# Patient Record
Sex: FEMALE | Race: OTHER | NOT HISPANIC OR LATINO | Employment: UNEMPLOYED | URBAN - NONMETROPOLITAN AREA
[De-identification: names, ages, dates, MRNs, and addresses within clinical notes are randomized per-mention and may not be internally consistent; named-entity substitution may affect disease eponyms.]

---

## 2017-07-31 ENCOUNTER — OFFICE VISIT (OUTPATIENT)
Dept: NEUROSURGERY | Facility: CLINIC | Age: 57
End: 2017-07-31

## 2017-07-31 ENCOUNTER — HOSPITAL ENCOUNTER (OUTPATIENT)
Dept: GENERAL RADIOLOGY | Facility: HOSPITAL | Age: 57
Discharge: HOME | End: 2017-07-31
Admitting: NURSE PRACTITIONER

## 2017-07-31 VITALS — HEIGHT: 60 IN | BODY MASS INDEX: 23.56 KG/M2 | WEIGHT: 120 LBS

## 2017-07-31 DIAGNOSIS — M54.41 CHRONIC BILATERAL LOW BACK PAIN WITH RIGHT-SIDED SCIATICA: Primary | ICD-10-CM

## 2017-07-31 DIAGNOSIS — G89.29 CHRONIC BILATERAL LOW BACK PAIN WITH RIGHT-SIDED SCIATICA: Primary | ICD-10-CM

## 2017-07-31 PROBLEM — IMO0001 NORMAL BODY MASS INDEX (BMI): Status: ACTIVE | Noted: 2017-07-31

## 2017-07-31 PROBLEM — Z78.9 TOBACCO NON-USER: Status: ACTIVE | Noted: 2017-07-31

## 2017-07-31 PROCEDURE — 72114 X-RAY EXAM L-S SPINE BENDING: CPT

## 2017-07-31 PROCEDURE — 99204 OFFICE O/P NEW MOD 45 MIN: CPT | Performed by: NURSE PRACTITIONER

## 2017-07-31 RX ORDER — ETODOLAC 400 MG/1
400 TABLET, FILM COATED ORAL 2 TIMES DAILY
COMMUNITY
End: 2018-07-24

## 2017-07-31 RX ORDER — ASPIRIN 81 MG/1
81 TABLET ORAL DAILY
COMMUNITY

## 2017-07-31 RX ORDER — LOSARTAN POTASSIUM 25 MG/1
25 TABLET ORAL DAILY
COMMUNITY
End: 2020-07-01

## 2017-07-31 RX ORDER — SIMVASTATIN 10 MG
10 TABLET ORAL NIGHTLY
COMMUNITY

## 2017-07-31 NOTE — PROGRESS NOTES
Neurosurgery Initial Patient Visit    Patient: Nikki Hutchins  : 1960    Primary Care Provider: Robi Tom DO    Requesting Provider: GARY Hennessy      History    Chief Complaint:   Chief Complaint   Patient presents with   • Back Pain     Complains of low back and right hip/leg pain since 2016. No injury. Complains of pain all the way down right leg and occasional numbness in R foot. She has trouble sitting or standing for prolonged periods. No physical therapy. No pain management. Has tried steriods with no relief. Dr. Jonas has ruled out any hip problems. She has not had any imaging of her back.       History of Present Illness: She is a 56-year-old female who presents with low back, right hip, and right leg pain.  She is referred by GARY Hennessy, and we have been asked to see the patient in consultation for further evaluation.    She gives history of onset of right lower back and right hip pain in 2016.  She does not recall a specific contributing injury.  She has continued to have issues and describes pain that radiates down the right leg more posterior laterally.  This does at times reach all the way to the right foot and occasionally she feels tingling in the right leg.  Sometimes both of her feet will feel numb as well as her hands, but she does not describe any other radicular features in the left leg.  She underwent orthopedic evaluation of the right hip and no abnormalities were found.  She has also had blood work, specifically to check for rheumatoid arthritis or other inflammatory process and does not yet know the results.  Last year, she initially saw a chiropractor but it did not help her.  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  She states that her pain is constant to some degree and sometimes she feels it radiating along the right groin.  She has been treated with  anti-inflammatories as well as oral steroids but states she has not gotten good relief.  Her only imaging to this point is specific to the right hip.    Allergies:   Niacin and related    Past Medical History:    Past Medical History:   Diagnosis Date   • Arthritis    • Hypertension        Past Surgical History:   Past Surgical History:   Procedure Laterality Date   • BLADDER SUSPENSION     • BUNIONECTOMY Bilateral    • CATARACT EXTRACTION Bilateral    •  SECTION     • HYSTERECTOMY         Medications:  Aspirin, Etodolac, Losartan, Simvastatin    Social History:   reports that she has never smoked. She has never used smokeless tobacco. She reports that she does not drink alcohol or use illicit drugs.  She is  and lives in [place].  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    Family History:  Also inlcudes rheumatoid arthritis, heart disease, and MI.  Family History   Problem Relation Age of Onset   • Arthritis Father    • Hypertension Brother        Review of Systems:  Review of Systems   Constitutional: Negative for chills, fever and unexpected weight change.   HENT: Negative for congestion, hearing loss, rhinorrhea, sore throat and tinnitus.    Eyes: Negative for photophobia and visual disturbance.        (+) Reading glasses   Respiratory: Negative for cough, shortness of breath and wheezing.    Cardiovascular: Negative for chest pain and palpitations.   Gastrointestinal: Negative for constipation, diarrhea, nausea and vomiting.   Genitourinary: Negative for difficulty urinating.   Musculoskeletal: Negative for arthralgias, back pain, gait problem and neck pain.   Skin: Negative for rash.   Allergic/Immunologic: Negative for environmental allergies.   Neurological: Negative for seizures, numbness and headaches.   Hematological: Bruises/bleeds easily.        (+)Aspirin use   Psychiatric/Behavioral: Negative for confusion. The patient is not nervous/anxious.    All other systems reviewed and are  negative.          Neurological Physical Examination    Physical Exam   Constitutional: She is oriented to person, place, and time. She appears well-developed and well-nourished. No distress.   She is pleasant and cooperative in no acute distress.  She sometimes speaks very rapidly and her speech is somewhat heavily accented.   HENT:   Head: Normocephalic and atraumatic.   Eyes: No scleral icterus.   Neck: Neck supple. No tracheal deviation and normal range of motion present.   Cardiovascular: Normal rate, regular rhythm and normal heart sounds.    No murmur heard.  Pulmonary/Chest: Effort normal and breath sounds normal. No respiratory distress. She has no wheezes.   Musculoskeletal:   She ambulates with fairly steady gait.  Lumbar mobility is quite good.  Straight leg raising is done relatively well bilaterally.  Perhaps some mild response on the right, but at a fairly 90°.  Deep tendon reflexes 1-2+.  There is some weakness of right dorsiflexion.  No tenderness upon palpation of the lumbar spine, right SI joint, right buttock or right lateral trochanter.   Neurological: She is alert and oriented to person, place, and time. She displays normal reflexes. No cranial nerve deficit.   Optic discs not visualized.   Skin: Skin is warm and dry. No rash noted.   Psychiatric: She has a normal mood and affect. Her speech is normal and behavior is normal. Cognition and memory are normal.   Vitals reviewed.         Medical Decision Making    Management Options:  In the office we have discussed her care and I have reviewed her referral records.  We have been able to obtain her lab test results that show an unremarkable CRP normal sedimentation rate, negative APOORVA, and negative RA factor.    We will plan to review these with the patient at her follow-up appointment.  We have agreed that she needs an x-ray of the lumbar spine and we will also order physical therapy for 2-3 weeks.  She has asked if it is okay for her to do home  exercises and to try a Pilates machine.  She may try this to see if she can tolerate it.  She feels she can keep working her normal schedule.  We will see her back in about a month, hoping that she has a good response to therapy.  If she cannot improve, then we will need an MRI of the lumbar spine to further evaluate her right lower extremity radiculopathy and weakness.  She understands and is agreeable.    Nikki was seen today for back pain.    Diagnoses and all orders for this visit:    Chronic bilateral low back pain with right-sided sciatica  -     XR Spine Lumbar Complete With Flex & Ext  -     Ambulatory Referral to Physical Therapy Evaluate and treat (3 days a week x 3-4 weeks)    Normal body mass index (BMI)    Tobacco non-user        Thank you, GARY Hennessy, for this Consultation and the opportunity to participate in the care of this pleasant patient.

## 2017-07-31 NOTE — PATIENT INSTRUCTIONS

## 2017-09-11 ENCOUNTER — OFFICE VISIT (OUTPATIENT)
Dept: NEUROSURGERY | Facility: CLINIC | Age: 57
End: 2017-09-11

## 2017-09-11 VITALS
DIASTOLIC BLOOD PRESSURE: 76 MMHG | SYSTOLIC BLOOD PRESSURE: 120 MMHG | WEIGHT: 120 LBS | BODY MASS INDEX: 23.56 KG/M2 | HEIGHT: 60 IN

## 2017-09-11 DIAGNOSIS — IMO0001 NORMAL BODY MASS INDEX (BMI): ICD-10-CM

## 2017-09-11 DIAGNOSIS — M43.16 SPONDYLOLISTHESIS, LUMBAR REGION: Primary | ICD-10-CM

## 2017-09-11 DIAGNOSIS — M54.16 LUMBAR RADICULOPATHY: ICD-10-CM

## 2017-09-11 DIAGNOSIS — Z78.9 TOBACCO NON-USER: ICD-10-CM

## 2017-09-11 PROBLEM — G89.29 CHRONIC BILATERAL LOW BACK PAIN WITH RIGHT-SIDED SCIATICA: Status: RESOLVED | Noted: 2017-07-31 | Resolved: 2017-09-11

## 2017-09-11 PROBLEM — M54.41 CHRONIC BILATERAL LOW BACK PAIN WITH RIGHT-SIDED SCIATICA: Status: RESOLVED | Noted: 2017-07-31 | Resolved: 2017-09-11

## 2017-09-11 PROCEDURE — 99213 OFFICE O/P EST LOW 20 MIN: CPT | Performed by: NURSE PRACTITIONER

## 2017-09-11 RX ORDER — TRAMADOL HYDROCHLORIDE 50 MG/1
50 TABLET ORAL 3 TIMES DAILY
Qty: 90 TABLET | Refills: 0 | Status: SHIPPED | OUTPATIENT
Start: 2017-09-11 | End: 2017-10-11

## 2017-09-11 RX ORDER — GABAPENTIN 300 MG/1
CAPSULE ORAL
Qty: 60 CAPSULE | Refills: 2 | Status: SHIPPED | OUTPATIENT
Start: 2017-09-11 | End: 2018-07-24

## 2017-09-11 RX ORDER — LISINOPRIL 10 MG/1
TABLET ORAL
COMMUNITY
Start: 2017-08-29 | End: 2018-07-24

## 2017-09-11 NOTE — PROGRESS NOTES
"Neurosurgery Follow Up Office Visit      Patient Name:  Nikki Hutchins  Age:  56 y.o.  YOB: 1960  MR#:  [id number]    /76 (BP Location: Right arm, Patient Position: Sitting)  Ht 60\" (152.4 cm)  Wt 120 lb (54.4 kg)  BMI 23.44 kg/m2    Social History   Substance Use Topics   • Smoking status: Never Smoker   • Smokeless tobacco: Never Used   • Alcohol use No       Chief Complaint   Patient presents with   • Follow-up     Therapy is not helping. States that she is not able to improve due to working. She complains that work is causing her pain. She would like to have her pain medication changed to Cymbalta, she has never taken this before. Apparently her co-workers have recommended this.         History  Chief Complaint:  She returns to the office today for follow-up of low back and right leg pain, after physical therapy and for x-ray results.  She tells me that she is feeling about the same, but describes that she is having more trouble with work.  She has been doing physical therapy and has had at least 12 sessions.  She does not feel she is getting a lot of relief or improvement.  She had wanted to keep working if at all possible, but she is having a very difficult time with prolonged standing.  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  She had wanted to avoid any more medication if possible, but she tells me that sometimes her pain is so bad that she is crying.  She had a lumbar x-ray with flexion and extension that show significant listhesis at L4 5.  It is measured at approximately 8 mm and decreases down to 4 mm with flexion.      Physical Examination:  Upon exam, she looks pretty good.  She is awake and alert, pleasant and cooperative, speech is clear and appropriate.  Skin is warm and dry.  Straight leg raising is positive on the right.  Deep tendon reflexes 1-2+ bilaterally.  She continues with weakness of right dorsiflexion.  She also has a " mild weakness of left dorsiflexion as well.      Medical Decision Making  Treatment Options:   In the office we have discussed her care and I reviewed the x-ray results with her.  She has significant listhesis that is unstable, and I think likely surgical.  She needs to have an MRI to determine if there is any other abnormality that would need surgical treatment.  I think she has done a reasonable amount of physical therapy and I would put any further visits on hold.  She states this would be a big financial relief and she is very committed to continuing exercises at home.  We have talked a lot about medication today and will start her on Neurontin as well as Ultram.  With regards to work, we will place her off work until her follow-up appointment to review the MRI.  Due to her findings of listhesis and the fact that she is a probable candidate for lumbar fusion, we will have her to return with Dr. Zamora for full imaging review, surgical discussion and planning as indicated.  She is agreeable.      Assessment and Plan  Nikki was seen today for follow-up.    Diagnoses and all orders for this visit:    Spondylolisthesis, lumbar region  -     MRI Lumbar Spine Without Contrast; Future  -     gabapentin (NEURONTIN) 300 MG capsule; Take 1 tablet at bedtime for one week then start taking twice daily (morning and bedtime)  -     traMADol (ULTRAM) 50 MG tablet; Take 1 tablet by mouth 3 (Three) Times a Day for 30 days.    Lumbar radiculopathy  -     MRI Lumbar Spine Without Contrast; Future  -     gabapentin (NEURONTIN) 300 MG capsule; Take 1 tablet at bedtime for one week then start taking twice daily (morning and bedtime)  -     traMADol (ULTRAM) 50 MG tablet; Take 1 tablet by mouth 3 (Three) Times a Day for 30 days.    Normal body mass index (BMI)    Tobacco non-user        Return in about 3 weeks (around 10/2/2017) for MRI results and discuss surgery with Dr. Zamora.      GARY Hood

## 2017-10-04 ENCOUNTER — OFFICE VISIT (OUTPATIENT)
Dept: NEUROSURGERY | Facility: CLINIC | Age: 57
End: 2017-10-04

## 2017-10-04 ENCOUNTER — HOSPITAL ENCOUNTER (OUTPATIENT)
Dept: MRI IMAGING | Facility: HOSPITAL | Age: 57
Discharge: HOME | End: 2017-10-04
Admitting: NURSE PRACTITIONER

## 2017-10-04 VITALS
HEIGHT: 60 IN | WEIGHT: 120 LBS | SYSTOLIC BLOOD PRESSURE: 119 MMHG | DIASTOLIC BLOOD PRESSURE: 70 MMHG | BODY MASS INDEX: 23.56 KG/M2

## 2017-10-04 DIAGNOSIS — IMO0001 NORMAL BODY MASS INDEX (BMI): ICD-10-CM

## 2017-10-04 DIAGNOSIS — M54.16 LUMBAR RADICULOPATHY: ICD-10-CM

## 2017-10-04 DIAGNOSIS — M54.16 LUMBAR RADICULOPATHY: Primary | ICD-10-CM

## 2017-10-04 DIAGNOSIS — Z78.9 TOBACCO NON-USER: ICD-10-CM

## 2017-10-04 DIAGNOSIS — G89.29 CHRONIC BILATERAL LOW BACK PAIN WITH RIGHT-SIDED SCIATICA: ICD-10-CM

## 2017-10-04 DIAGNOSIS — M54.41 CHRONIC BILATERAL LOW BACK PAIN WITH RIGHT-SIDED SCIATICA: ICD-10-CM

## 2017-10-04 DIAGNOSIS — M43.16 SPONDYLOLISTHESIS, LUMBAR REGION: ICD-10-CM

## 2017-10-04 PROCEDURE — 72148 MRI LUMBAR SPINE W/O DYE: CPT

## 2017-10-04 PROCEDURE — 99213 OFFICE O/P EST LOW 20 MIN: CPT | Performed by: NEUROLOGICAL SURGERY

## 2017-10-04 NOTE — PROGRESS NOTES
"    Chief complaint   Chief Complaint   Patient presents with   • Back Pain        Subjective     HPI:     This patient is a 56-year-old female who presents with a one-year history of chief complaint of right hip pain and right leg pain.  She describes her pain as being in her iliac crest region along the right side.  Occasionally the pain will radiate to her right thigh above her knee.  She has undergone chiropractic manipulation without significant relief of her pain.  She has not undergone pain management or physical therapy and she presents for discussion of imaging results.  She does not endorse leg weakness or numbness or tingling.    Review of Systems     A 12 point review systems is obtained and is negative except for as described in HPI    Past Medical History:   Diagnosis Date   • Arthritis    • Hypertension      Past Surgical History:   Procedure Laterality Date   • BLADDER SUSPENSION     • BUNIONECTOMY Bilateral    • CATARACT EXTRACTION Bilateral    •  SECTION     • HYSTERECTOMY       Family History   Problem Relation Age of Onset   • Arthritis Father    • Hypertension Brother      Social History   Substance Use Topics   • Smoking status: Never Smoker   • Smokeless tobacco: Never Used   • Alcohol use No       (Not in a hospital admission)  Allergies:  Niacin and related    Objective      Vital Signs  /70  Ht 60\" (152.4 cm)  Wt 120 lb (54.4 kg)  BMI 23.44 kg/m2    Physical Exam    No acute distress  Awake alert oriented ×3  HEENT atraumatic also found  Neck supple  Abdomen soft, nontender  Extremities no clubbing, edema, cyanosis  Neurologic exam  Cranial nerves II through XII intact  Patient moves all extremities 5 out of 5 strength  Sensation is intact light touch in upper and lower extremities  No long tract signs  2+ patellar reflex, 2+ Achilles reflex  Patient points to right iliac crest region is source of predominant pain    Results Review: No results found.    MRI lumbar spine " from 10/04/2017 reveals a L 3, 4 moderate stenosis with spondylolisthesis        Assessment/Plan:     56-year-old female with a one-year history of right hip pain and a mobile L3, 4 spondylolisthesis.  I will directly reviewed imaging findings with the patient have also discussed imaging findings with the patient's  over the telephone.  I discussed the risks, benefits, alternatives of a L3, 4 decompression with instrumented fusion.  Patient acknowledges and declines surgical intervention at this time.  Patient and her  wish to proceed with a trial of conservative management including physical therapy and pain management.  We will make these referrals for her and follow-up with her in 6 months or sooner with any questions or concerns.  Thank you for this consultation.    I discussed the patients findings and my recommendations with patient    Frank Zamora MD  10/04/17  1:35 PM

## 2018-07-20 ENCOUNTER — HOSPITAL ENCOUNTER (OUTPATIENT)
Facility: HOSPITAL | Age: 58
Setting detail: SURGERY ADMIT
End: 2018-07-20
Attending: ORTHOPAEDIC SURGERY | Admitting: ORTHOPAEDIC SURGERY

## 2018-07-24 ENCOUNTER — HOSPITAL ENCOUNTER (OUTPATIENT)
Dept: GENERAL RADIOLOGY | Facility: HOSPITAL | Age: 58
Discharge: HOME | End: 2018-07-24
Admitting: ORTHOPAEDIC SURGERY

## 2018-07-24 ENCOUNTER — APPOINTMENT (OUTPATIENT)
Dept: PREADMISSION TESTING | Facility: HOSPITAL | Age: 58
End: 2018-07-24

## 2018-07-24 VITALS
WEIGHT: 117.28 LBS | SYSTOLIC BLOOD PRESSURE: 127 MMHG | HEIGHT: 60 IN | HEART RATE: 74 BPM | OXYGEN SATURATION: 98 % | BODY MASS INDEX: 23.03 KG/M2 | DIASTOLIC BLOOD PRESSURE: 74 MMHG

## 2018-07-24 LAB
ALBUMIN SERPL-MCNC: 4.7 G/DL (ref 3.5–5)
ALBUMIN/GLOB SERPL: 1.5 G/DL (ref 1.1–2.5)
ALP SERPL-CCNC: 78 U/L (ref 24–120)
ALT SERPL W P-5'-P-CCNC: 27 U/L (ref 0–54)
ANION GAP SERPL CALCULATED.3IONS-SCNC: 15 MMOL/L (ref 4–13)
APTT PPP: 27.9 SECONDS (ref 24.1–34.8)
AST SERPL-CCNC: 34 U/L (ref 7–45)
BACTERIA UR QL AUTO: ABNORMAL /HPF
BASOPHILS # BLD AUTO: 0.07 10*3/MM3 (ref 0–0.2)
BASOPHILS NFR BLD AUTO: 1 % (ref 0–2)
BILIRUB SERPL-MCNC: 0.3 MG/DL (ref 0.1–1)
BILIRUB UR QL STRIP: NEGATIVE
BUN BLD-MCNC: 16 MG/DL (ref 5–21)
BUN/CREAT SERPL: 15.8 (ref 7–25)
CALCIUM SPEC-SCNC: 9.7 MG/DL (ref 8.4–10.4)
CHLORIDE SERPL-SCNC: 102 MMOL/L (ref 98–110)
CLARITY UR: CLEAR
CO2 SERPL-SCNC: 25 MMOL/L (ref 24–31)
COLOR UR: YELLOW
CREAT BLD-MCNC: 1.01 MG/DL (ref 0.5–1.4)
DEPRECATED RDW RBC AUTO: 40.1 FL (ref 40–54)
EOSINOPHIL # BLD AUTO: 0.18 10*3/MM3 (ref 0–0.7)
EOSINOPHIL NFR BLD AUTO: 2.7 % (ref 0–4)
ERYTHROCYTE [DISTWIDTH] IN BLOOD BY AUTOMATED COUNT: 12.5 % (ref 12–15)
GFR SERPL CREATININE-BSD FRML MDRD: 56 ML/MIN/1.73
GFR SERPL CREATININE-BSD FRML MDRD: 68 ML/MIN/1.73
GLOBULIN UR ELPH-MCNC: 3.2 GM/DL
GLUCOSE BLD-MCNC: 110 MG/DL (ref 70–100)
GLUCOSE UR STRIP-MCNC: NEGATIVE MG/DL
HCT VFR BLD AUTO: 41.8 % (ref 37–47)
HGB BLD-MCNC: 13.6 G/DL (ref 12–16)
HGB UR QL STRIP.AUTO: ABNORMAL
HYALINE CASTS UR QL AUTO: ABNORMAL /LPF
IMM GRANULOCYTES # BLD: 0.01 10*3/MM3 (ref 0–0.03)
IMM GRANULOCYTES NFR BLD: 0.1 % (ref 0–5)
INR PPP: 0.84 (ref 0.91–1.09)
KETONES UR QL STRIP: NEGATIVE
LEUKOCYTE ESTERASE UR QL STRIP.AUTO: ABNORMAL
LYMPHOCYTES # BLD AUTO: 2.24 10*3/MM3 (ref 0.72–4.86)
LYMPHOCYTES NFR BLD AUTO: 33.6 % (ref 15–45)
MCH RBC QN AUTO: 28.5 PG (ref 28–32)
MCHC RBC AUTO-ENTMCNC: 32.5 G/DL (ref 33–36)
MCV RBC AUTO: 87.6 FL (ref 82–98)
MONOCYTES # BLD AUTO: 0.52 10*3/MM3 (ref 0.19–1.3)
MONOCYTES NFR BLD AUTO: 7.8 % (ref 4–12)
NEUTROPHILS # BLD AUTO: 3.65 10*3/MM3 (ref 1.87–8.4)
NEUTROPHILS NFR BLD AUTO: 54.8 % (ref 39–78)
NITRITE UR QL STRIP: NEGATIVE
NRBC BLD MANUAL-RTO: 0 /100 WBC (ref 0–0)
PH UR STRIP.AUTO: 5.5 [PH] (ref 5–8)
PLATELET # BLD AUTO: 336 10*3/MM3 (ref 130–400)
PMV BLD AUTO: 8.2 FL (ref 6–12)
POTASSIUM BLD-SCNC: 4 MMOL/L (ref 3.5–5.3)
PROT SERPL-MCNC: 7.9 G/DL (ref 6.3–8.7)
PROT UR QL STRIP: NEGATIVE
PROTHROMBIN TIME: 11.8 SECONDS (ref 11.9–14.6)
RBC # BLD AUTO: 4.77 10*6/MM3 (ref 4.2–5.4)
RBC # UR: ABNORMAL /HPF
REF LAB TEST METHOD: ABNORMAL
SODIUM BLD-SCNC: 142 MMOL/L (ref 135–145)
SP GR UR STRIP: 1.01 (ref 1–1.03)
SQUAMOUS #/AREA URNS HPF: ABNORMAL /HPF
UROBILINOGEN UR QL STRIP: ABNORMAL
WBC NRBC COR # BLD: 6.67 10*3/MM3 (ref 4.8–10.8)
WBC UR QL AUTO: ABNORMAL /HPF

## 2018-07-24 PROCEDURE — 85610 PROTHROMBIN TIME: CPT | Performed by: ORTHOPAEDIC SURGERY

## 2018-07-24 PROCEDURE — 85025 COMPLETE CBC W/AUTO DIFF WBC: CPT | Performed by: ORTHOPAEDIC SURGERY

## 2018-07-24 PROCEDURE — 81001 URINALYSIS AUTO W/SCOPE: CPT | Performed by: ORTHOPAEDIC SURGERY

## 2018-07-24 PROCEDURE — 71046 X-RAY EXAM CHEST 2 VIEWS: CPT

## 2018-07-24 PROCEDURE — 36415 COLL VENOUS BLD VENIPUNCTURE: CPT

## 2018-07-24 PROCEDURE — 85730 THROMBOPLASTIN TIME PARTIAL: CPT | Performed by: ORTHOPAEDIC SURGERY

## 2018-07-24 PROCEDURE — 80053 COMPREHEN METABOLIC PANEL: CPT | Performed by: ORTHOPAEDIC SURGERY

## 2018-07-24 PROCEDURE — 87086 URINE CULTURE/COLONY COUNT: CPT | Performed by: ORTHOPAEDIC SURGERY

## 2018-07-24 PROCEDURE — 87186 SC STD MICRODIL/AGAR DIL: CPT | Performed by: ORTHOPAEDIC SURGERY

## 2018-07-24 PROCEDURE — 87077 CULTURE AEROBIC IDENTIFY: CPT | Performed by: ORTHOPAEDIC SURGERY

## 2018-07-24 PROCEDURE — 93010 ELECTROCARDIOGRAM REPORT: CPT | Performed by: INTERNAL MEDICINE

## 2018-07-24 PROCEDURE — 93005 ELECTROCARDIOGRAM TRACING: CPT

## 2018-07-24 RX ORDER — LANOLIN ALCOHOL/MO/W.PET/CERES
1000 CREAM (GRAM) TOPICAL DAILY
COMMUNITY

## 2018-07-24 RX ORDER — NAPROXEN SODIUM 220 MG
220 TABLET ORAL 2 TIMES DAILY PRN
COMMUNITY
End: 2018-08-02 | Stop reason: HOSPADM

## 2018-07-24 NOTE — DISCHARGE INSTRUCTIONS
DAY OF SURGERY INSTRUCTIONS        YOUR SURGEON: ***    PROCEDURE: ***lateral lumbar interbody fusion left lumbar 4-5, possible right lumbar 5-sacral 1    DATE OF SURGERY: ***7/30/18    ARRIVAL TIME: AS DIRECTED BY OFFICE    DAY OF SURGERY TAKE ONLY THESE MEDICATIONS UNLESS OTHERWISE INSTRUCTED BY YOUR PHYSICIAN: ***none        MANAGING PAIN AFTER SURGERY    We know you are probably wondering what your pain will be like after surgery.  Following surgery it is unrealistic to expect you will not have pain.   Pain is how our bodies let us know that something is wrong or cautions us to be careful.  That said, our goal is to make your pain tolerable.    Methods we may use to treat your pain include (oral or IV medications, PCAs, epidurals, nerve blocks, etc.)   While some procedures require IV pain medications for a short time after surgery, transitioning to pain medications by mouth allows for better management of pain.   Your nurse will encourage you to take oral pain medications whenever possible.  IV medications work almost immediately, but only last a short while.  Taking medications by mouth allows for a more constant level of medication in your blood stream for a longer period of time.      Once your pain is out of control it is harder to get back under control.  It is important you are aware when your next dose of pain medication is due.  If you are admitted, your nurse may write the time of your next dose on the white board in your room to help you remember.      We are interested in your pain and encourage you to inform us about aggravating factors during your visit.   Many times a simple repositioning every few hours can make a big difference.    If your physician says it is okay, do not let your pain prevent you from getting out of bed. Be sure to call your nurse for assistance prior to getting up so you do not fall.      Before surgery, please decide your tolerable pain goal.  These faces help  describe the pain ratings we use on a 0-10 scale.   Be prepared to tell us your goal and whether or not you take pain or anxiety medications at home.          BEFORE YOU COME TO THE HOSPITAL  (Pre-op instructions)  • Do not eat, drink, smoke or chew gum after midnight the night before surgery.  This also includes no mints.  • Morning of surgery take only the medicines you have been instructed with a sip of water unless otherwise instructed  by your physician.  • Do not shave, wear makeup or dark nail polish.  • Remove all jewelry including rings.  • Leave anything you consider valuable at home.  • Leave your suitcase in the car until after your surgery.  • Bring the following with you if applicable:  o Picture ID and insurance, Medicare or Medicaid cards  o Co-pay/deductible required by insurance (cash, check, credit card)  o Copy of advance directive, living will or power-of- documents if not brought to PAT  o CPAP or BIPAP mask and tubing  o Relaxation aids (MP3 player, book, magazine)  • On the day of surgery check in at registration located at the main entrance of the hospital.       Outpatient Surgery Guidelines, Adult  Outpatient procedures are those for which the person having the procedure is allowed to go home the same day as the procedure. Various procedures are done on an outpatient basis. You should follow some general guidelines if you will be having an outpatient procedure.  LET YOUR HEALTH CARE PROVIDER KNOW ABOUT:  · Any allergies you have.  · All medicines you are taking, including vitamins, herbs, eye drops, creams, and over-the-counter medicines.  · Previous problems you or members of your family have had with the use of anesthetics.  · Any blood disorders you have.  · Previous surgeries you have had.  · Medical conditions you have.  RISKS AND COMPLICATIONS  Your health care provider will discuss possible risks and complications with you before surgery. Common risks and complications  include:    · Problems due to the use of anesthetics.  · Blood loss and replacement (does not apply to minor surgical procedures).  · Temporary increase in pain due to surgery.  · Uncorrected pain or problems that the surgery was meant to correct.  · Infection.  · New damage.  BEFORE THE PROCEDURE  · Ask your health care provider about changing or stopping your regular medicines. You may need to stop taking certain medicines in the days or weeks before the procedure.  · Stop smoking at least 2 weeks before surgery. This lowers your risk for complications during and after surgery. Ask your health care provider for help with this if needed.  · Eat your usual meals and a light supper the day before surgery. Continue fluid intake. Do not drink alcohol.  · Do not eat or drink after midnight the night before your surgery.   · Arrange for someone to take you home and to stay with you for 24 hours after the procedure. Medicine given for your procedure may affect your ability to drive or to care for yourself.  · Call your health care provider's office if you develop an illness or problem that may prevent you from safely having your procedure.  AFTER THE PROCEDURE  After surgery, you will be taken to a recovery area, where your progress will be monitored. If there are no complications, you will be allowed to go home when you are awake, stable, and taking fluids well. You may have numbness around the surgical site. Healing will take some time. You will have tenderness at the surgical site and may have some swelling and bruising. You may also have some nausea.  HOME CARE INSTRUCTIONS  · Do not drive for 24 hours, or as directed by your health care provider. Do not drive while taking prescription pain medicines.  · Do not drink alcohol for 24 hours.  · Do not make important decisions or sign legal documents for 24 hours.  · You may resume a normal diet and activities as directed.  · Do not lift anything heavier than 10 pounds  (4.5 kg) or play contact sports until your health care provider says it is okay.  · Change your bandages (dressings) as directed.  · Only take over-the-counter or prescription medicines as directed by your health care provider.  · Follow up with your health care provider as directed.  SEEK MEDICAL CARE IF:  · You have increased bleeding (more than a small spot) from the surgical site.  · You have redness, swelling, or increasing pain in the wound.  · You see pus coming from the wound.  · You have a fever.  · You notice a bad smell coming from the wound or dressing.  · You feel lightheaded or faint.  · You develop a rash.  · You have trouble breathing.  · You develop allergies.  MAKE SURE YOU:  · Understand these instructions.  · Will watch your condition.  · Will get help right away if you are not doing well or get worse.     This information is not intended to replace advice given to you by your health care provider. Make sure you discuss any questions you have with your health care provider.     Document Released: 09/12/2002 Document Revised: 05/03/2016 Document Reviewed: 05/22/2014  .[employer] Interactive Patient Education ©2016 [employer]       Fall Prevention in Hospitals, Adult  As a hospital patient, your condition and the treatments you receive can increase your risk for falls. Some additional risk factors for falls in a hospital include:  · Being in an unfamiliar environment.  · Being on bed rest.  · Your surgery.  · Taking certain medicines.  · Your tubing requirements, such as intravenous (IV) therapy or catheters.  It is important that you learn how to decrease fall risks while at the hospital. Below are important tips that can help prevent falls.  SAFETY TIPS FOR PREVENTING FALLS  Talk about your risk of falling.  · Ask your health care provider why you are at risk for falling. Is it your medicine, illness, tubing placement, or something else?  · Make a plan with your health care provider to keep you  safe from falls.  · Ask your health care provider or pharmacist about side effects of your medicines. Some medicines can make you dizzy or affect your coordination.  Ask for help.  · Ask for help before getting out of bed. You may need to press your call button.  · Ask for assistance in getting safely to the toilet.  · Ask for a walker or cane to be put at your bedside. Ask that most of the side rails on your bed be placed up before your health care provider leaves the room.  · Ask family or friends to sit with you.  · Ask for things that are out of your reach, such as your glasses, hearing aids, telephone, bedside table, or call button.  Follow these tips to avoid falling:  · Stay lying or seated, rather than standing, while waiting for help.  · Wear rubber-soled slippers or shoes whenever you walk in the hospital.  · Avoid quick, sudden movements.  ¨ Change positions slowly.  ¨ Sit on the side of your bed before standing.  ¨ Stand up slowly and wait before you start to walk.  · Let your health care provider know if there is a spill on the floor.  · Pay careful attention to the medical equipment, electrical cords, and tubes around you.  · When you need help, use your call button by your bed or in the bathroom. Wait for one of your health care providers to help you.  · If you feel dizzy or unsure of your footing, return to bed and wait for assistance.  · Avoid being distracted by the TV, telephone, or another person in your room.  · Do not lean or support yourself on rolling objects, such as IV poles or bedside tables.     This information is not intended to replace advice given to you by your health care provider. Make sure you discuss any questions you have with your health care provider.     Document Released: 12/15/2001 Document Revised: 01/08/2016 Document Reviewed: 08/25/2013  [employer] Kind [employer] Interactive Patient Education ©2016 [employer]       Surgical Site Infections FAQs  What is a Surgical Site Infection  (SSI)?  A surgical site infection is an infection that occurs after surgery in the part of the body where the surgery took place. Most patients who have surgery do not develop an infection. However, infections develop in about 1 to 3 out of every 100 patients who have surgery.  Some of the common symptoms of a surgical site infection are:  · Redness and pain around the area where you had surgery  · Drainage of cloudy fluid from your surgical wound  · Fever  Can SSIs be treated?  Yes. Most surgical site infections can be treated with antibiotics. The antibiotic given to you depends on the bacteria (germs) causing the infection. Sometimes patients with SSIs also need another surgery to treat the infection.  What are some of the things that hospitals are doing to prevent SSIs?  To prevent SSIs, doctors, nurses, and other healthcare providers:  · Clean their hands and arms up to their elbows with an antiseptic agent just before the surgery.  · Clean their hands with soap and water or an alcohol-based hand rub before and after caring for each patient.  · May remove some of your hair immediately before your surgery using electric clippers if the hair is in the same area where the procedure will occur. They should not shave you with a razor.  · Wear special hair covers, masks, gowns, and gloves during surgery to keep the surgery area clean.  · Give you antibiotics before your surgery starts. In most cases, you should get antibiotics within 60 minutes before the surgery starts and the antibiotics should be stopped within 24 hours after surgery.  · Clean the skin at the site of your surgery with a special soap that kills germs.  What can I do to help prevent SSIs?  Before your surgery:  · Tell your doctor about other medical problems you may have. Health problems such as allergies, diabetes, and obesity could affect your surgery and your treatment.  · Quit smoking. Patients who smoke get more infections. Talk to your doctor  about how you can quit before your surgery.  · Do not shave near where you will have surgery. Shaving with a razor can irritate your skin and make it easier to develop an infection.  At the time of your surgery:  · Speak up if someone tries to shave you with a razor before surgery. Ask why you need to be shaved and talk with your surgeon if you have any concerns.  · Ask if you will get antibiotics before surgery.  After your surgery:  · Make sure that your healthcare providers clean their hands before examining you, either with soap and water or an alcohol-based hand rub.  · If you do not see your providers clean their hands, please ask them to do so.  · Family and friends who visit you should not touch the surgical wound or dressings.  · Family and friends should clean their hands with soap and water or an alcohol-based hand rub before and after visiting you. If you do not see them clean their hands, ask them to clean their hands.  What do I need to do when I go home from the hospital?  · Before you go home, your doctor or nurse should explain everything you need to know about taking care of your wound. Make sure you understand how to care for your wound before you leave the hospital.  · Always clean your hands before and after caring for your wound.  · Before you go home, make sure you know who to contact if you have questions or problems after you get home.  · If you have any symptoms of an infection, such as redness and pain at the surgery site, drainage, or fever, call your doctor immediately.  If you have additional questions, please ask your doctor or nurse.  Developed and co-sponsored by The Society for Healthcare Epidemiology of [place] (SHEA); Infectious Diseases Society of [place] (IDSA); American Hospital Association; Association for Professionals in Infection Control and Epidemiology (APIC); Centers for Disease Control and Prevention (CDC); and The Joint Commission.     This information is not intended  to replace advice given to you by your health care provider. Make sure you discuss any questions you have with your health care provider.     Document Released: 12/23/2014 Document Revised: 01/08/2016 Document Reviewed: 03/02/2016  [employer] Interactive Patient Education ©2016 [employer].       [facility]  CHG 4% Patient Instruction Sheet    Preparing the Skin Before Surgery  Preparing or “prepping” skin before surgery can reduce the risk of infection at the surgical site. To make the process easier,[facility] has chosen 4% Chlorhexidine Gluconate (CHG) antiseptic solution.   The steps below outline the prepping process and should be carefully followed.                                                                                                                                                      Prep the skin at the following time(s):                                                      We recommend you shower the night before surgery, and again the morning of surgery with the 4% CHG antiseptic solution using half of the bottle and a cloth each time.  Dress in clean clothes/sleepwear after showering.  See instructions below for application.          Do not apply any lotions or moisturizers.       Do not shave the area to be prepped for at least 2 days prior to surgery.    Clipping the hair may be done immediately prior to your surgery at the hospital    if needed.    Directions:  Thoroughly rinse your body with water.  Apply 4% CHG to a cloth and wash skin gently, paying special attention to the operative site.  Rinse again thoroughly.  Once you have begun using this product do not apply anything else to your skin. If itching or redness persists, rinse affected areas and discontinue use.    When using this product:  • Keep out of eyes, ears, and mouth.  • If solution should contact these areas, rinse out promptly and thoroughly with water.  • For external use only.  • Do not use in genital area, on your  face or head.      PATIENT/FAMILY/RESPONSIBLE PARTY VERBALIZES UNDERSTANDING OF ABOVE EDUCATION.  COPY OF PAIN SCALE GIVEN AND REVIEWED WITH VERBALIZED UNDERSTANDING.

## 2018-07-26 LAB — BACTERIA SPEC AEROBE CULT: ABNORMAL

## 2018-07-27 ENCOUNTER — ANESTHESIA EVENT (OUTPATIENT)
Dept: PERIOP | Facility: HOSPITAL | Age: 58
End: 2018-07-27

## 2018-07-30 ENCOUNTER — APPOINTMENT (OUTPATIENT)
Dept: GENERAL RADIOLOGY | Facility: HOSPITAL | Age: 58
End: 2018-07-30

## 2018-07-30 ENCOUNTER — HOSPITAL ENCOUNTER (INPATIENT)
Facility: HOSPITAL | Age: 58
LOS: 3 days | Discharge: HOME | End: 2018-08-02
Attending: ORTHOPAEDIC SURGERY | Admitting: ORTHOPAEDIC SURGERY

## 2018-07-30 ENCOUNTER — ANESTHESIA (OUTPATIENT)
Dept: PERIOP | Facility: HOSPITAL | Age: 58
End: 2018-07-30

## 2018-07-30 PROBLEM — I10 ESSENTIAL HYPERTENSION: Chronic | Status: ACTIVE | Noted: 2018-07-30

## 2018-07-30 PROBLEM — M48.061 LUMBAR STENOSIS: Status: ACTIVE | Noted: 2018-07-30

## 2018-07-30 PROBLEM — E78.00 PURE HYPERCHOLESTEROLEMIA: Chronic | Status: ACTIVE | Noted: 2018-07-30

## 2018-07-30 LAB
ABO GROUP BLD: NORMAL
BLD GP AB SCN SERPL QL: NEGATIVE
RH BLD: POSITIVE
T&S EXPIRATION DATE: NORMAL

## 2018-07-30 PROCEDURE — 0SB20ZZ EXCISION OF LUMBAR VERTEBRAL DISC, OPEN APPROACH: ICD-10-PCS | Performed by: ORTHOPAEDIC SURGERY

## 2018-07-30 PROCEDURE — 4A11X4G MONITORING OF PERIPHERAL NERVOUS ELECTRICAL ACTIVITY, INTRAOPERATIVE, EXTERNAL APPROACH: ICD-10-PCS | Performed by: ORTHOPAEDIC SURGERY

## 2018-07-30 PROCEDURE — 86901 BLOOD TYPING SEROLOGIC RH(D): CPT | Performed by: ORTHOPAEDIC SURGERY

## 2018-07-30 PROCEDURE — 86850 RBC ANTIBODY SCREEN: CPT | Performed by: ORTHOPAEDIC SURGERY

## 2018-07-30 PROCEDURE — 94799 UNLISTED PULMONARY SVC/PX: CPT

## 2018-07-30 PROCEDURE — 25010000002 HEPARIN (PORCINE) PER 1000 UNITS: Performed by: ORTHOPAEDIC SURGERY

## 2018-07-30 PROCEDURE — 07DR3ZZ EXTRACTION OF ILIAC BONE MARROW, PERCUTANEOUS APPROACH: ICD-10-PCS | Performed by: ORTHOPAEDIC SURGERY

## 2018-07-30 PROCEDURE — 72100 X-RAY EXAM L-S SPINE 2/3 VWS: CPT

## 2018-07-30 PROCEDURE — C1713 ANCHOR/SCREW BN/BN,TIS/BN: HCPCS | Performed by: ORTHOPAEDIC SURGERY

## 2018-07-30 PROCEDURE — 25010000002 ONDANSETRON PER 1 MG: Performed by: NURSE ANESTHETIST, CERTIFIED REGISTERED

## 2018-07-30 PROCEDURE — 25010000002 PROPOFOL 1000 MG/ML EMULSION: Performed by: NURSE ANESTHETIST, CERTIFIED REGISTERED

## 2018-07-30 PROCEDURE — 25010000002 HYDROMORPHONE PER 4 MG: Performed by: ORTHOPAEDIC SURGERY

## 2018-07-30 PROCEDURE — 25010000002 DEXAMETHASONE PER 1 MG: Performed by: ANESTHESIOLOGY

## 2018-07-30 PROCEDURE — 0SG00A0 FUSION OF LUMBAR VERTEBRAL JOINT WITH INTERBODY FUSION DEVICE, ANTERIOR APPROACH, ANTERIOR COLUMN, OPEN APPROACH: ICD-10-PCS | Performed by: ORTHOPAEDIC SURGERY

## 2018-07-30 PROCEDURE — 25010000002 MIDAZOLAM PER 1 MG: Performed by: ANESTHESIOLOGY

## 2018-07-30 PROCEDURE — 25010000002 DEXAMETHASONE PER 1 MG: Performed by: NURSE ANESTHETIST, CERTIFIED REGISTERED

## 2018-07-30 PROCEDURE — 76000 FLUOROSCOPY <1 HR PHYS/QHP: CPT

## 2018-07-30 PROCEDURE — 0SG30A0 FUSION OF LUMBOSACRAL JOINT WITH INTERBODY FUSION DEVICE, ANTERIOR APPROACH, ANTERIOR COLUMN, OPEN APPROACH: ICD-10-PCS | Performed by: ORTHOPAEDIC SURGERY

## 2018-07-30 PROCEDURE — 25010000003 CEFAZOLIN PER 500 MG: Performed by: ORTHOPAEDIC SURGERY

## 2018-07-30 PROCEDURE — 0SB40ZZ EXCISION OF LUMBOSACRAL DISC, OPEN APPROACH: ICD-10-PCS | Performed by: ORTHOPAEDIC SURGERY

## 2018-07-30 PROCEDURE — 25010000002 SUCCINYLCHOLINE PER 20 MG: Performed by: NURSE ANESTHETIST, CERTIFIED REGISTERED

## 2018-07-30 PROCEDURE — 25010000002 PROPOFOL 10 MG/ML EMULSION: Performed by: NURSE ANESTHETIST, CERTIFIED REGISTERED

## 2018-07-30 PROCEDURE — 25010000002 MIDAZOLAM PER 1 MG: Performed by: NURSE ANESTHETIST, CERTIFIED REGISTERED

## 2018-07-30 PROCEDURE — 25010000002 ONDANSETRON PER 1 MG: Performed by: ORTHOPAEDIC SURGERY

## 2018-07-30 PROCEDURE — 86900 BLOOD TYPING SEROLOGIC ABO: CPT | Performed by: ORTHOPAEDIC SURGERY

## 2018-07-30 DEVICE — IMPLANTABLE DEVICE: Type: IMPLANTABLE DEVICE | Status: FUNCTIONAL

## 2018-07-30 DEVICE — PLT IPF TIMBERLINE LP 2H 12MM: Type: IMPLANTABLE DEVICE | Status: FUNCTIONAL

## 2018-07-30 DEVICE — CVR IPF TIMBERLINE 2H: Type: IMPLANTABLE DEVICE | Status: FUNCTIONAL

## 2018-07-30 DEVICE — SPACR IPF TIMBERLINE LRD 12X22X50MM 8D: Type: IMPLANTABLE DEVICE | Status: FUNCTIONAL

## 2018-07-30 DEVICE — BONE FILLER VOID NANOSS BIOACTIVE 3D 20CC: Type: IMPLANTABLE DEVICE | Status: FUNCTIONAL

## 2018-07-30 DEVICE — SCRW IPF TIMBERLINE VARI S/TAP 5.5X35MM: Type: IMPLANTABLE DEVICE | Status: FUNCTIONAL

## 2018-07-30 RX ORDER — FAMOTIDINE 20 MG/1
20 TABLET, FILM COATED ORAL EVERY 12 HOURS SCHEDULED
Status: DISCONTINUED | OUTPATIENT
Start: 2018-07-30 | End: 2018-08-02 | Stop reason: HOSPADM

## 2018-07-30 RX ORDER — LABETALOL HYDROCHLORIDE 5 MG/ML
5 INJECTION, SOLUTION INTRAVENOUS
Status: DISCONTINUED | OUTPATIENT
Start: 2018-07-30 | End: 2018-07-30 | Stop reason: HOSPADM

## 2018-07-30 RX ORDER — SODIUM CHLORIDE 0.9 % (FLUSH) 0.9 %
1-10 SYRINGE (ML) INJECTION AS NEEDED
Status: DISCONTINUED | OUTPATIENT
Start: 2018-07-30 | End: 2018-08-02 | Stop reason: HOSPADM

## 2018-07-30 RX ORDER — SODIUM CHLORIDE, SODIUM LACTATE, POTASSIUM CHLORIDE, CALCIUM CHLORIDE 600; 310; 30; 20 MG/100ML; MG/100ML; MG/100ML; MG/100ML
100 INJECTION, SOLUTION INTRAVENOUS CONTINUOUS
Status: DISCONTINUED | OUTPATIENT
Start: 2018-07-30 | End: 2018-07-30

## 2018-07-30 RX ORDER — MEPERIDINE HYDROCHLORIDE 50 MG/ML
12.5 INJECTION INTRAMUSCULAR; INTRAVENOUS; SUBCUTANEOUS
Status: DISCONTINUED | OUTPATIENT
Start: 2018-07-30 | End: 2018-07-30 | Stop reason: HOSPADM

## 2018-07-30 RX ORDER — ONDANSETRON 4 MG/1
4 TABLET, ORALLY DISINTEGRATING ORAL EVERY 6 HOURS PRN
Status: DISCONTINUED | OUTPATIENT
Start: 2018-07-30 | End: 2018-08-02 | Stop reason: HOSPADM

## 2018-07-30 RX ORDER — LIDOCAINE HYDROCHLORIDE 20 MG/ML
INJECTION, SOLUTION INFILTRATION; PERINEURAL AS NEEDED
Status: DISCONTINUED | OUTPATIENT
Start: 2018-07-30 | End: 2018-07-30 | Stop reason: SURG

## 2018-07-30 RX ORDER — SODIUM CHLORIDE 0.9 % (FLUSH) 0.9 %
1-10 SYRINGE (ML) INJECTION AS NEEDED
Status: DISCONTINUED | OUTPATIENT
Start: 2018-07-30 | End: 2018-07-30

## 2018-07-30 RX ORDER — HEPARIN SODIUM 10000 [USP'U]/ML
INJECTION, SOLUTION INTRAVENOUS; SUBCUTANEOUS AS NEEDED
Status: DISCONTINUED | OUTPATIENT
Start: 2018-07-30 | End: 2018-07-30 | Stop reason: HOSPADM

## 2018-07-30 RX ORDER — CHOLECALCIFEROL (VITAMIN D3) 125 MCG
1000 CAPSULE ORAL DAILY
Status: DISCONTINUED | OUTPATIENT
Start: 2018-07-30 | End: 2018-08-02 | Stop reason: HOSPADM

## 2018-07-30 RX ORDER — SODIUM CHLORIDE, SODIUM LACTATE, POTASSIUM CHLORIDE, CALCIUM CHLORIDE 600; 310; 30; 20 MG/100ML; MG/100ML; MG/100ML; MG/100ML
1000 INJECTION, SOLUTION INTRAVENOUS CONTINUOUS
Status: DISCONTINUED | OUTPATIENT
Start: 2018-07-30 | End: 2018-07-30

## 2018-07-30 RX ORDER — SODIUM CHLORIDE 9 MG/ML
75 INJECTION, SOLUTION INTRAVENOUS CONTINUOUS
Status: DISPENSED | OUTPATIENT
Start: 2018-07-30 | End: 2018-08-01

## 2018-07-30 RX ORDER — FAMOTIDINE 10 MG/ML
20 INJECTION, SOLUTION INTRAVENOUS EVERY 12 HOURS SCHEDULED
Status: DISCONTINUED | OUTPATIENT
Start: 2018-07-30 | End: 2018-08-02 | Stop reason: HOSPADM

## 2018-07-30 RX ORDER — SUCCINYLCHOLINE CHLORIDE 20 MG/ML
INJECTION INTRAMUSCULAR; INTRAVENOUS AS NEEDED
Status: DISCONTINUED | OUTPATIENT
Start: 2018-07-30 | End: 2018-07-30 | Stop reason: SURG

## 2018-07-30 RX ORDER — ONDANSETRON 2 MG/ML
INJECTION INTRAMUSCULAR; INTRAVENOUS AS NEEDED
Status: DISCONTINUED | OUTPATIENT
Start: 2018-07-30 | End: 2018-07-30 | Stop reason: SURG

## 2018-07-30 RX ORDER — ATORVASTATIN CALCIUM 10 MG/1
10 TABLET, FILM COATED ORAL NIGHTLY
Status: DISCONTINUED | OUTPATIENT
Start: 2018-07-30 | End: 2018-08-02 | Stop reason: HOSPADM

## 2018-07-30 RX ORDER — HYDROMORPHONE HYDROCHLORIDE 1 MG/ML
1 INJECTION, SOLUTION INTRAMUSCULAR; INTRAVENOUS; SUBCUTANEOUS
Status: DISCONTINUED | OUTPATIENT
Start: 2018-07-30 | End: 2018-08-02 | Stop reason: RX

## 2018-07-30 RX ORDER — TIZANIDINE 4 MG/1
4 TABLET ORAL EVERY 8 HOURS PRN
Status: DISCONTINUED | OUTPATIENT
Start: 2018-07-30 | End: 2018-08-02 | Stop reason: HOSPADM

## 2018-07-30 RX ORDER — DIPHENHYDRAMINE HCL 25 MG
25 CAPSULE ORAL NIGHTLY PRN
Status: DISCONTINUED | OUTPATIENT
Start: 2018-07-30 | End: 2018-08-02 | Stop reason: HOSPADM

## 2018-07-30 RX ORDER — LOSARTAN POTASSIUM 25 MG/1
25 TABLET ORAL DAILY
Status: DISCONTINUED | OUTPATIENT
Start: 2018-07-30 | End: 2018-08-02 | Stop reason: HOSPADM

## 2018-07-30 RX ORDER — ACETAMINOPHEN 500 MG
1000 TABLET ORAL ONCE
Status: COMPLETED | OUTPATIENT
Start: 2018-07-30 | End: 2018-07-30

## 2018-07-30 RX ORDER — SUFENTANIL CITRATE 50 UG/ML
INJECTION EPIDURAL; INTRAVENOUS AS NEEDED
Status: DISCONTINUED | OUTPATIENT
Start: 2018-07-30 | End: 2018-07-30 | Stop reason: SURG

## 2018-07-30 RX ORDER — SULFAMETHOXAZOLE AND TRIMETHOPRIM 800; 160 MG/1; MG/1
1 TABLET ORAL 2 TIMES DAILY
COMMUNITY
End: 2018-08-02 | Stop reason: HOSPADM

## 2018-07-30 RX ORDER — ONDANSETRON 2 MG/ML
4 INJECTION INTRAMUSCULAR; INTRAVENOUS AS NEEDED
Status: DISCONTINUED | OUTPATIENT
Start: 2018-07-30 | End: 2018-07-30 | Stop reason: HOSPADM

## 2018-07-30 RX ORDER — ONDANSETRON 2 MG/ML
4 INJECTION INTRAMUSCULAR; INTRAVENOUS EVERY 6 HOURS PRN
Status: DISCONTINUED | OUTPATIENT
Start: 2018-07-30 | End: 2018-07-30 | Stop reason: SDUPTHER

## 2018-07-30 RX ORDER — ROCURONIUM BROMIDE 10 MG/ML
INJECTION, SOLUTION INTRAVENOUS AS NEEDED
Status: DISCONTINUED | OUTPATIENT
Start: 2018-07-30 | End: 2018-07-30 | Stop reason: SURG

## 2018-07-30 RX ORDER — FENTANYL CITRATE 50 UG/ML
25 INJECTION, SOLUTION INTRAMUSCULAR; INTRAVENOUS AS NEEDED
Status: DISCONTINUED | OUTPATIENT
Start: 2018-07-30 | End: 2018-07-30 | Stop reason: HOSPADM

## 2018-07-30 RX ORDER — OXYCODONE HCL 20 MG/1
20 TABLET, FILM COATED, EXTENDED RELEASE ORAL ONCE
Status: COMPLETED | OUTPATIENT
Start: 2018-07-30 | End: 2018-07-30

## 2018-07-30 RX ORDER — MORPHINE SULFATE 2 MG/ML
2 INJECTION, SOLUTION INTRAMUSCULAR; INTRAVENOUS
Status: DISCONTINUED | OUTPATIENT
Start: 2018-07-30 | End: 2018-07-30 | Stop reason: HOSPADM

## 2018-07-30 RX ORDER — OXYCODONE AND ACETAMINOPHEN 10; 325 MG/1; MG/1
1 TABLET ORAL ONCE AS NEEDED
Status: COMPLETED | OUTPATIENT
Start: 2018-07-30 | End: 2018-07-30

## 2018-07-30 RX ORDER — FAMOTIDINE 10 MG/ML
20 INJECTION, SOLUTION INTRAVENOUS
Status: DISCONTINUED | OUTPATIENT
Start: 2018-07-30 | End: 2018-07-30

## 2018-07-30 RX ORDER — MIDAZOLAM HYDROCHLORIDE 1 MG/ML
2 INJECTION INTRAMUSCULAR; INTRAVENOUS
Status: DISCONTINUED | OUTPATIENT
Start: 2018-07-30 | End: 2018-07-30

## 2018-07-30 RX ORDER — DEXAMETHASONE SODIUM PHOSPHATE 4 MG/ML
4 INJECTION, SOLUTION INTRA-ARTICULAR; INTRALESIONAL; INTRAMUSCULAR; INTRAVENOUS; SOFT TISSUE ONCE AS NEEDED
Status: COMPLETED | OUTPATIENT
Start: 2018-07-30 | End: 2018-07-30

## 2018-07-30 RX ORDER — METOCLOPRAMIDE HYDROCHLORIDE 5 MG/ML
5 INJECTION INTRAMUSCULAR; INTRAVENOUS
Status: DISCONTINUED | OUTPATIENT
Start: 2018-07-30 | End: 2018-07-30 | Stop reason: HOSPADM

## 2018-07-30 RX ORDER — DEXAMETHASONE SODIUM PHOSPHATE 4 MG/ML
INJECTION, SOLUTION INTRA-ARTICULAR; INTRALESIONAL; INTRAMUSCULAR; INTRAVENOUS; SOFT TISSUE AS NEEDED
Status: DISCONTINUED | OUTPATIENT
Start: 2018-07-30 | End: 2018-07-30 | Stop reason: SURG

## 2018-07-30 RX ORDER — IPRATROPIUM BROMIDE AND ALBUTEROL SULFATE 2.5; .5 MG/3ML; MG/3ML
3 SOLUTION RESPIRATORY (INHALATION) ONCE AS NEEDED
Status: DISCONTINUED | OUTPATIENT
Start: 2018-07-30 | End: 2018-07-30 | Stop reason: HOSPADM

## 2018-07-30 RX ORDER — ONDANSETRON 2 MG/ML
4 INJECTION INTRAMUSCULAR; INTRAVENOUS EVERY 6 HOURS PRN
Status: DISCONTINUED | OUTPATIENT
Start: 2018-07-30 | End: 2018-08-02 | Stop reason: HOSPADM

## 2018-07-30 RX ORDER — MIDAZOLAM HYDROCHLORIDE 1 MG/ML
INJECTION INTRAMUSCULAR; INTRAVENOUS AS NEEDED
Status: DISCONTINUED | OUTPATIENT
Start: 2018-07-30 | End: 2018-07-30 | Stop reason: SURG

## 2018-07-30 RX ORDER — NALOXONE HCL 0.4 MG/ML
0.04 VIAL (ML) INJECTION AS NEEDED
Status: DISCONTINUED | OUTPATIENT
Start: 2018-07-30 | End: 2018-07-30 | Stop reason: HOSPADM

## 2018-07-30 RX ORDER — MAGNESIUM HYDROXIDE 1200 MG/15ML
LIQUID ORAL AS NEEDED
Status: DISCONTINUED | OUTPATIENT
Start: 2018-07-30 | End: 2018-07-30 | Stop reason: HOSPADM

## 2018-07-30 RX ORDER — ONDANSETRON 4 MG/1
4 TABLET, FILM COATED ORAL EVERY 6 HOURS PRN
Status: DISCONTINUED | OUTPATIENT
Start: 2018-07-30 | End: 2018-08-02 | Stop reason: HOSPADM

## 2018-07-30 RX ORDER — MIDAZOLAM HYDROCHLORIDE 1 MG/ML
1 INJECTION INTRAMUSCULAR; INTRAVENOUS
Status: DISCONTINUED | OUTPATIENT
Start: 2018-07-30 | End: 2018-07-30

## 2018-07-30 RX ORDER — FLUMAZENIL 0.1 MG/ML
0.2 INJECTION INTRAVENOUS AS NEEDED
Status: DISCONTINUED | OUTPATIENT
Start: 2018-07-30 | End: 2018-07-30 | Stop reason: HOSPADM

## 2018-07-30 RX ORDER — HYDRALAZINE HYDROCHLORIDE 20 MG/ML
5 INJECTION INTRAMUSCULAR; INTRAVENOUS
Status: DISCONTINUED | OUTPATIENT
Start: 2018-07-30 | End: 2018-07-30 | Stop reason: HOSPADM

## 2018-07-30 RX ORDER — OXYCODONE AND ACETAMINOPHEN 10; 325 MG/1; MG/1
2 TABLET ORAL EVERY 4 HOURS PRN
Status: DISCONTINUED | OUTPATIENT
Start: 2018-07-30 | End: 2018-08-02 | Stop reason: HOSPADM

## 2018-07-30 RX ORDER — PROPOFOL 10 MG/ML
VIAL (ML) INTRAVENOUS AS NEEDED
Status: DISCONTINUED | OUTPATIENT
Start: 2018-07-30 | End: 2018-07-30 | Stop reason: SURG

## 2018-07-30 RX ORDER — SODIUM CHLORIDE, SODIUM LACTATE, POTASSIUM CHLORIDE, CALCIUM CHLORIDE 600; 310; 30; 20 MG/100ML; MG/100ML; MG/100ML; MG/100ML
9 INJECTION, SOLUTION INTRAVENOUS CONTINUOUS PRN
Status: DISCONTINUED | OUTPATIENT
Start: 2018-07-30 | End: 2018-07-30

## 2018-07-30 RX ADMIN — DEXAMETHASONE SODIUM PHOSPHATE 4 MG: 4 INJECTION, SOLUTION INTRAMUSCULAR; INTRAVENOUS at 12:45

## 2018-07-30 RX ADMIN — ATORVASTATIN CALCIUM 10 MG: 10 TABLET, FILM COATED ORAL at 20:26

## 2018-07-30 RX ADMIN — ONDANSETRON HYDROCHLORIDE 4 MG: 2 SOLUTION INTRAMUSCULAR; INTRAVENOUS at 12:45

## 2018-07-30 RX ADMIN — SUFENTANIL CITRATE 10 MCG: 50 INJECTION, SOLUTION EPIDURAL; INTRAVENOUS at 11:13

## 2018-07-30 RX ADMIN — SODIUM CHLORIDE, POTASSIUM CHLORIDE, SODIUM LACTATE AND CALCIUM CHLORIDE 9 ML/HR: 600; 310; 30; 20 INJECTION, SOLUTION INTRAVENOUS at 09:48

## 2018-07-30 RX ADMIN — SODIUM CHLORIDE 75 ML/HR: 9 INJECTION, SOLUTION INTRAVENOUS at 15:00

## 2018-07-30 RX ADMIN — OXYCODONE HYDROCHLORIDE AND ACETAMINOPHEN 1 TABLET: 10; 325 TABLET ORAL at 20:26

## 2018-07-30 RX ADMIN — HYDROMORPHONE HYDROCHLORIDE 1 MG: 1 INJECTION, SOLUTION INTRAMUSCULAR; INTRAVENOUS; SUBCUTANEOUS at 16:23

## 2018-07-30 RX ADMIN — SUFENTANIL CITRATE 10 MCG: 50 INJECTION, SOLUTION EPIDURAL; INTRAVENOUS at 11:40

## 2018-07-30 RX ADMIN — SUFENTANIL CITRATE 10 MCG: 50 INJECTION, SOLUTION EPIDURAL; INTRAVENOUS at 12:05

## 2018-07-30 RX ADMIN — PROPOFOL 150 MG: 10 INJECTION, EMULSION INTRAVENOUS at 10:47

## 2018-07-30 RX ADMIN — ROCURONIUM BROMIDE 10 MG: 10 INJECTION INTRAVENOUS at 10:47

## 2018-07-30 RX ADMIN — ACETAMINOPHEN 1000 MG: 500 TABLET, FILM COATED ORAL at 10:12

## 2018-07-30 RX ADMIN — PROPOFOL 75 MCG/KG/MIN: 10 INJECTION, EMULSION INTRAVENOUS at 10:50

## 2018-07-30 RX ADMIN — FAMOTIDINE 20 MG: 20 TABLET, FILM COATED ORAL at 20:26

## 2018-07-30 RX ADMIN — OXYCODONE HYDROCHLORIDE 20 MG: 20 TABLET, FILM COATED, EXTENDED RELEASE ORAL at 09:47

## 2018-07-30 RX ADMIN — Medication 1 G: at 10:56

## 2018-07-30 RX ADMIN — LIDOCAINE HYDROCHLORIDE 100 MG: 20 INJECTION, SOLUTION INFILTRATION; PERINEURAL at 10:47

## 2018-07-30 RX ADMIN — SODIUM CHLORIDE, POTASSIUM CHLORIDE, SODIUM LACTATE AND CALCIUM CHLORIDE 1000 ML: 600; 310; 30; 20 INJECTION, SOLUTION INTRAVENOUS at 09:52

## 2018-07-30 RX ADMIN — SODIUM CHLORIDE 75 ML/HR: 9 INJECTION, SOLUTION INTRAVENOUS at 13:40

## 2018-07-30 RX ADMIN — MIDAZOLAM HYDROCHLORIDE 5 MG: 1 INJECTION, SOLUTION INTRAMUSCULAR; INTRAVENOUS at 10:47

## 2018-07-30 RX ADMIN — ONDANSETRON 4 MG: 2 INJECTION, SOLUTION INTRAMUSCULAR; INTRAVENOUS at 17:49

## 2018-07-30 RX ADMIN — SUFENTANIL CITRATE 10 MCG: 50 INJECTION, SOLUTION EPIDURAL; INTRAVENOUS at 12:18

## 2018-07-30 RX ADMIN — DEXAMETHASONE SODIUM PHOSPHATE 4 MG: 4 INJECTION, SOLUTION INTRA-ARTICULAR; INTRALESIONAL; INTRAMUSCULAR; INTRAVENOUS; SOFT TISSUE at 10:13

## 2018-07-30 RX ADMIN — FAMOTIDINE 20 MG: 10 INJECTION, SOLUTION INTRAVENOUS at 10:12

## 2018-07-30 RX ADMIN — MIDAZOLAM 2 MG: 1 INJECTION INTRAMUSCULAR; INTRAVENOUS at 10:12

## 2018-07-30 RX ADMIN — Medication 1 TABLET: at 17:35

## 2018-07-30 RX ADMIN — SUCCINYLCHOLINE CHLORIDE 160 MG: 20 INJECTION, SOLUTION INTRAMUSCULAR; INTRAVENOUS at 10:47

## 2018-07-30 RX ADMIN — TIZANIDINE 4 MG: 4 TABLET ORAL at 17:43

## 2018-07-30 RX ADMIN — OXYCODONE HYDROCHLORIDE AND ACETAMINOPHEN 1 TABLET: 10; 325 TABLET ORAL at 14:34

## 2018-07-30 RX ADMIN — CEFAZOLIN 1 G: 1 INJECTION, POWDER, FOR SOLUTION INTRAMUSCULAR; INTRAVENOUS at 17:36

## 2018-07-30 RX ADMIN — Medication 1000 MCG: at 17:35

## 2018-07-30 RX ADMIN — SUFENTANIL CITRATE 10 MCG: 50 INJECTION, SOLUTION EPIDURAL; INTRAVENOUS at 10:47

## 2018-07-30 NOTE — ANESTHESIA PROCEDURE NOTES
Airway  Urgency: elective    Airway not difficult    General Information and Staff    Patient location during procedure: OR  CRNA: CASSIE SMILEY    Indications and Patient Condition  Indications for airway management: airway protection    Preoxygenated: yes  MILS maintained throughout  Mask difficulty assessment: 1 - vent by mask    Final Airway Details  Final airway type: endotracheal airway      Successful airway: ETT  Cuffed: yes   Successful intubation technique: direct laryngoscopy  Facilitating devices/methods: intubating stylet  Endotracheal tube insertion site: oral  Blade: Zacarias  Blade size: #2  ETT size: 7.0 mm  Cormack-Lehane Classification: grade I - full view of glottis  Placement verified by: chest auscultation, capnometry and palpation of cuff   Cuff volume (mL): 8  Measured from: gums  ETT to gums (cm): 20  Number of attempts at approach: 1

## 2018-07-30 NOTE — ANESTHESIA PREPROCEDURE EVALUATION
Anesthesia Evaluation     Patient summary reviewed and Nursing notes reviewed   no history of anesthetic complications:  NPO Solid Status: > 8 hours  NPO Liquid Status: > 8 hours           Airway   Mallampati: I  TM distance: >3 FB  Neck ROM: full  No difficulty expected  Dental      Pulmonary - negative pulmonary ROS    breath sounds clear to auscultation  Cardiovascular   Exercise tolerance: good (4-7 METS)    ECG reviewed  Rhythm: regular  Rate: normal    (+) hypertension, hyperlipidemia,       Neuro/Psych  (+) numbness,     GI/Hepatic/Renal/Endo - negative ROS     Musculoskeletal     Abdominal    Substance History - negative use     OB/GYN negative ob/gyn ROS         Other   (+) arthritis                     Anesthesia Plan    ASA 2     general     intravenous induction   Anesthetic plan and risks discussed with patient.

## 2018-07-30 NOTE — ANESTHESIA POSTPROCEDURE EVALUATION
"Patient: Nikki Hutchins    Procedure Summary     Date:  07/30/18 Room / Location:  [facility] OR  /  [facility] OR    Anesthesia Start:  1041 Anesthesia Stop:  1319    Procedure:  LATERAL LUMBAR INTERBODY FUSION RIGHT L4-5,  L5-S1 (Right Spine Lumbar) Diagnosis:  (M54.16)    Surgeon:  RICARDO Parr MD Provider:  Padmaja Ratliff CRNA    Anesthesia Type:  general ASA Status:  2          Anesthesia Type: general  Last vitals  BP   117/58 (07/30/18 1527)   Temp   97.3 °F (36.3 °C) (07/30/18 1527)   Pulse   55 (07/30/18 1527)   Resp   16 (07/30/18 1501)     SpO2   97 % (07/30/18 1527)     Post Anesthesia Care and Evaluation    Patient location during evaluation: PACU  Patient participation: complete - patient participated  Level of consciousness: awake and alert  Pain management: adequate  Airway patency: patent  Anesthetic complications: No anesthetic complications  PONV Status: none  Cardiovascular status: acceptable and hemodynamically stable  Respiratory status: acceptable  Hydration status: acceptable    Comments: Blood pressure 117/58, pulse 55, temperature 97.3 °F (36.3 °C), temperature source Oral, resp. rate 16, height 152 cm (59.84\"), weight 53.2 kg (117 lb 4.6 oz), SpO2 97 %.    Patient discharged from PACU based upon Hanna score. Please see RN notes for further details      "

## 2018-07-31 PROBLEM — K59.03 DRUG-INDUCED CONSTIPATION: Status: ACTIVE | Noted: 2018-07-31

## 2018-07-31 LAB
ANION GAP SERPL CALCULATED.3IONS-SCNC: 13 MMOL/L (ref 4–13)
BASOPHILS # BLD AUTO: 0 10*3/MM3 (ref 0–0.2)
BASOPHILS NFR BLD AUTO: 0 % (ref 0–2)
BUN BLD-MCNC: 14 MG/DL (ref 5–21)
BUN/CREAT SERPL: 23.3 (ref 7–25)
CALCIUM SPEC-SCNC: 8.9 MG/DL (ref 8.4–10.4)
CHLORIDE SERPL-SCNC: 101 MMOL/L (ref 98–110)
CO2 SERPL-SCNC: 24 MMOL/L (ref 24–31)
CREAT BLD-MCNC: 0.6 MG/DL (ref 0.5–1.4)
DEPRECATED RDW RBC AUTO: 41.1 FL (ref 40–54)
EOSINOPHIL # BLD AUTO: 0 10*3/MM3 (ref 0–0.7)
EOSINOPHIL NFR BLD AUTO: 0 % (ref 0–4)
ERYTHROCYTE [DISTWIDTH] IN BLOOD BY AUTOMATED COUNT: 12.6 % (ref 12–15)
GFR SERPL CREATININE-BSD FRML MDRD: 102 ML/MIN/1.73
GFR SERPL CREATININE-BSD FRML MDRD: 124 ML/MIN/1.73
GLUCOSE BLD-MCNC: 139 MG/DL (ref 70–100)
HCT VFR BLD AUTO: 33.5 % (ref 37–47)
HGB BLD-MCNC: 10.9 G/DL (ref 12–16)
IMM GRANULOCYTES # BLD: 0.04 10*3/MM3 (ref 0–0.03)
IMM GRANULOCYTES NFR BLD: 0.4 % (ref 0–5)
LYMPHOCYTES # BLD AUTO: 0.69 10*3/MM3 (ref 0.72–4.86)
LYMPHOCYTES NFR BLD AUTO: 6.7 % (ref 15–45)
MCH RBC QN AUTO: 28.9 PG (ref 28–32)
MCHC RBC AUTO-ENTMCNC: 32.5 G/DL (ref 33–36)
MCV RBC AUTO: 88.9 FL (ref 82–98)
MONOCYTES # BLD AUTO: 0.78 10*3/MM3 (ref 0.19–1.3)
MONOCYTES NFR BLD AUTO: 7.6 % (ref 4–12)
NEUTROPHILS # BLD AUTO: 8.75 10*3/MM3 (ref 1.87–8.4)
NEUTROPHILS NFR BLD AUTO: 85.3 % (ref 39–78)
NRBC BLD MANUAL-RTO: 0 /100 WBC (ref 0–0)
PLATELET # BLD AUTO: 247 10*3/MM3 (ref 130–400)
PMV BLD AUTO: 8.4 FL (ref 6–12)
POTASSIUM BLD-SCNC: 4.8 MMOL/L (ref 3.5–5.3)
RBC # BLD AUTO: 3.77 10*6/MM3 (ref 4.2–5.4)
SODIUM BLD-SCNC: 138 MMOL/L (ref 135–145)
WBC NRBC COR # BLD: 10.26 10*3/MM3 (ref 4.8–10.8)

## 2018-07-31 PROCEDURE — 25010000002 ONDANSETRON PER 1 MG: Performed by: ORTHOPAEDIC SURGERY

## 2018-07-31 PROCEDURE — 94799 UNLISTED PULMONARY SVC/PX: CPT

## 2018-07-31 PROCEDURE — 80048 BASIC METABOLIC PNL TOTAL CA: CPT | Performed by: ORTHOPAEDIC SURGERY

## 2018-07-31 PROCEDURE — 63710000001 DIPHENHYDRAMINE PER 50 MG: Performed by: ORTHOPAEDIC SURGERY

## 2018-07-31 PROCEDURE — 25010000003 CEFAZOLIN PER 500 MG: Performed by: ORTHOPAEDIC SURGERY

## 2018-07-31 PROCEDURE — 85025 COMPLETE CBC W/AUTO DIFF WBC: CPT | Performed by: ORTHOPAEDIC SURGERY

## 2018-07-31 RX ADMIN — FAMOTIDINE 20 MG: 20 TABLET, FILM COATED ORAL at 21:18

## 2018-07-31 RX ADMIN — LOSARTAN POTASSIUM 25 MG: 25 TABLET ORAL at 08:34

## 2018-07-31 RX ADMIN — ONDANSETRON 4 MG: 2 INJECTION, SOLUTION INTRAMUSCULAR; INTRAVENOUS at 00:12

## 2018-07-31 RX ADMIN — POLYETHYLENE GLYCOL (3350) 17 G: 17 POWDER, FOR SOLUTION ORAL at 21:49

## 2018-07-31 RX ADMIN — OXYCODONE HYDROCHLORIDE AND ACETAMINOPHEN 2 TABLET: 10; 325 TABLET ORAL at 21:18

## 2018-07-31 RX ADMIN — FAMOTIDINE 20 MG: 20 TABLET, FILM COATED ORAL at 08:34

## 2018-07-31 RX ADMIN — SODIUM CHLORIDE 75 ML/HR: 9 INJECTION, SOLUTION INTRAVENOUS at 14:47

## 2018-07-31 RX ADMIN — POLYETHYLENE GLYCOL (3350) 17 G: 17 POWDER, FOR SOLUTION ORAL at 08:39

## 2018-07-31 RX ADMIN — Medication 1000 MCG: at 08:34

## 2018-07-31 RX ADMIN — OXYCODONE HYDROCHLORIDE AND ACETAMINOPHEN 2 TABLET: 10; 325 TABLET ORAL at 12:45

## 2018-07-31 RX ADMIN — ATORVASTATIN CALCIUM 10 MG: 10 TABLET, FILM COATED ORAL at 21:18

## 2018-07-31 RX ADMIN — CEFAZOLIN 1 G: 1 INJECTION, POWDER, FOR SOLUTION INTRAMUSCULAR; INTRAVENOUS at 01:21

## 2018-07-31 RX ADMIN — OXYCODONE HYDROCHLORIDE AND ACETAMINOPHEN 2 TABLET: 10; 325 TABLET ORAL at 04:08

## 2018-07-31 RX ADMIN — CEFAZOLIN 1 G: 1 INJECTION, POWDER, FOR SOLUTION INTRAMUSCULAR; INTRAVENOUS at 09:29

## 2018-07-31 RX ADMIN — SODIUM CHLORIDE 75 ML/HR: 9 INJECTION, SOLUTION INTRAVENOUS at 01:22

## 2018-07-31 RX ADMIN — OXYCODONE HYDROCHLORIDE AND ACETAMINOPHEN 2 TABLET: 10; 325 TABLET ORAL at 08:34

## 2018-07-31 RX ADMIN — ONDANSETRON 4 MG: 2 INJECTION, SOLUTION INTRAMUSCULAR; INTRAVENOUS at 08:31

## 2018-07-31 RX ADMIN — Medication 1 TABLET: at 12:45

## 2018-07-31 RX ADMIN — DIPHENHYDRAMINE HYDROCHLORIDE 25 MG: 25 CAPSULE ORAL at 21:18

## 2018-07-31 RX ADMIN — OXYCODONE HYDROCHLORIDE AND ACETAMINOPHEN 2 TABLET: 10; 325 TABLET ORAL at 00:13

## 2018-08-01 ENCOUNTER — ANESTHESIA (OUTPATIENT)
Dept: PERIOP | Facility: HOSPITAL | Age: 58
End: 2018-08-01

## 2018-08-01 ENCOUNTER — APPOINTMENT (OUTPATIENT)
Dept: GENERAL RADIOLOGY | Facility: HOSPITAL | Age: 58
End: 2018-08-01

## 2018-08-01 ENCOUNTER — ANESTHESIA EVENT (OUTPATIENT)
Dept: PERIOP | Facility: HOSPITAL | Age: 58
End: 2018-08-01

## 2018-08-01 LAB
IRON 24H UR-MRATE: 15 MCG/DL (ref 42–180)
IRON SATN MFR SERPL: 5 % (ref 20–45)
TIBC SERPL-MCNC: 315 MCG/DL (ref 225–420)
VIT B12 BLD-MCNC: >1000 PG/ML (ref 239–931)

## 2018-08-01 PROCEDURE — 0SG0071 FUSION OF LUMBAR VERTEBRAL JOINT WITH AUTOLOGOUS TISSUE SUBSTITUTE, POSTERIOR APPROACH, POSTERIOR COLUMN, OPEN APPROACH: ICD-10-PCS | Performed by: ORTHOPAEDIC SURGERY

## 2018-08-01 PROCEDURE — 25010000002 MIDAZOLAM PER 1 MG: Performed by: ANESTHESIOLOGY

## 2018-08-01 PROCEDURE — 25010000002 DEXAMETHASONE PER 1 MG: Performed by: ANESTHESIOLOGY

## 2018-08-01 PROCEDURE — 4A11X4G MONITORING OF PERIPHERAL NERVOUS ELECTRICAL ACTIVITY, INTRAOPERATIVE, EXTERNAL APPROACH: ICD-10-PCS | Performed by: ORTHOPAEDIC SURGERY

## 2018-08-01 PROCEDURE — 25010000002 IRON SUCROSE PER 1 MG: Performed by: FAMILY MEDICINE

## 2018-08-01 PROCEDURE — C1769 GUIDE WIRE: HCPCS | Performed by: ORTHOPAEDIC SURGERY

## 2018-08-01 PROCEDURE — 72100 X-RAY EXAM L-S SPINE 2/3 VWS: CPT

## 2018-08-01 PROCEDURE — 82607 VITAMIN B-12: CPT | Performed by: FAMILY MEDICINE

## 2018-08-01 PROCEDURE — 25010000002 PROPOFOL 10 MG/ML EMULSION: Performed by: NURSE ANESTHETIST, CERTIFIED REGISTERED

## 2018-08-01 PROCEDURE — 25010000002 ONDANSETRON PER 1 MG: Performed by: NURSE ANESTHETIST, CERTIFIED REGISTERED

## 2018-08-01 PROCEDURE — 0SG3071 FUSION OF LUMBOSACRAL JOINT WITH AUTOLOGOUS TISSUE SUBSTITUTE, POSTERIOR APPROACH, POSTERIOR COLUMN, OPEN APPROACH: ICD-10-PCS | Performed by: ORTHOPAEDIC SURGERY

## 2018-08-01 PROCEDURE — 25010000002 SUCCINYLCHOLINE PER 20 MG: Performed by: NURSE ANESTHETIST, CERTIFIED REGISTERED

## 2018-08-01 PROCEDURE — 94799 UNLISTED PULMONARY SVC/PX: CPT

## 2018-08-01 PROCEDURE — 83540 ASSAY OF IRON: CPT | Performed by: FAMILY MEDICINE

## 2018-08-01 PROCEDURE — 25010000002 DEXAMETHASONE PER 1 MG: Performed by: NURSE ANESTHETIST, CERTIFIED REGISTERED

## 2018-08-01 PROCEDURE — 76000 FLUOROSCOPY <1 HR PHYS/QHP: CPT

## 2018-08-01 PROCEDURE — 83550 IRON BINDING TEST: CPT | Performed by: FAMILY MEDICINE

## 2018-08-01 PROCEDURE — 25010000003 CEFAZOLIN PER 500 MG: Performed by: ORTHOPAEDIC SURGERY

## 2018-08-01 PROCEDURE — C1713 ANCHOR/SCREW BN/BN,TIS/BN: HCPCS | Performed by: ORTHOPAEDIC SURGERY

## 2018-08-01 DEVICE — TI CANNULATED POLYAXIAL SCREW 5.5MM X 45MM
Type: IMPLANTABLE DEVICE | Status: FUNCTIONAL
Brand: ILLICO

## 2018-08-01 DEVICE — TITANIUM HEXALOBE SET SCREW
Type: IMPLANTABLE DEVICE | Status: FUNCTIONAL
Brand: ZODIAC

## 2018-08-01 DEVICE — HEMO ABS GELFOAM PWDR PORCN 1GM: Type: IMPLANTABLE DEVICE | Status: FUNCTIONAL

## 2018-08-01 DEVICE — PRE-CONTOURED / C.P. TI ROD 5.5MM X 6CM
Type: IMPLANTABLE DEVICE | Status: FUNCTIONAL
Brand: ILLICO

## 2018-08-01 DEVICE — PRE-CONTOURED / C.P. TI ROD 5.5MM 7CM
Type: IMPLANTABLE DEVICE | Status: FUNCTIONAL
Brand: ILLICO

## 2018-08-01 RX ORDER — LABETALOL HYDROCHLORIDE 5 MG/ML
5 INJECTION, SOLUTION INTRAVENOUS
Status: DISCONTINUED | OUTPATIENT
Start: 2018-08-01 | End: 2018-08-01 | Stop reason: HOSPADM

## 2018-08-01 RX ORDER — NALOXONE HCL 0.4 MG/ML
0.04 VIAL (ML) INJECTION AS NEEDED
Status: DISCONTINUED | OUTPATIENT
Start: 2018-08-01 | End: 2018-08-01 | Stop reason: HOSPADM

## 2018-08-01 RX ORDER — FENTANYL CITRATE 50 UG/ML
25 INJECTION, SOLUTION INTRAMUSCULAR; INTRAVENOUS AS NEEDED
Status: DISCONTINUED | OUTPATIENT
Start: 2018-08-01 | End: 2018-08-01 | Stop reason: HOSPADM

## 2018-08-01 RX ORDER — MIDAZOLAM HYDROCHLORIDE 1 MG/ML
2 INJECTION INTRAMUSCULAR; INTRAVENOUS
Status: DISCONTINUED | OUTPATIENT
Start: 2018-08-01 | End: 2018-08-01 | Stop reason: HOSPADM

## 2018-08-01 RX ORDER — ONDANSETRON 2 MG/ML
INJECTION INTRAMUSCULAR; INTRAVENOUS AS NEEDED
Status: DISCONTINUED | OUTPATIENT
Start: 2018-08-01 | End: 2018-08-01 | Stop reason: SURG

## 2018-08-01 RX ORDER — OXYCODONE AND ACETAMINOPHEN 10; 325 MG/1; MG/1
1 TABLET ORAL ONCE AS NEEDED
Status: DISCONTINUED | OUTPATIENT
Start: 2018-08-01 | End: 2018-08-01 | Stop reason: HOSPADM

## 2018-08-01 RX ORDER — PHENYLEPHRINE HCL IN 0.9% NACL 0.8MG/10ML
SYRINGE (ML) INTRAVENOUS AS NEEDED
Status: DISCONTINUED | OUTPATIENT
Start: 2018-08-01 | End: 2018-08-01 | Stop reason: SURG

## 2018-08-01 RX ORDER — SODIUM CHLORIDE 9 MG/ML
75 INJECTION, SOLUTION INTRAVENOUS CONTINUOUS
Status: DISCONTINUED | OUTPATIENT
Start: 2018-08-01 | End: 2018-08-02 | Stop reason: HOSPADM

## 2018-08-01 RX ORDER — METOCLOPRAMIDE HYDROCHLORIDE 5 MG/ML
5 INJECTION INTRAMUSCULAR; INTRAVENOUS
Status: DISCONTINUED | OUTPATIENT
Start: 2018-08-01 | End: 2018-08-01 | Stop reason: HOSPADM

## 2018-08-01 RX ORDER — PROPOFOL 10 MG/ML
VIAL (ML) INTRAVENOUS AS NEEDED
Status: DISCONTINUED | OUTPATIENT
Start: 2018-08-01 | End: 2018-08-01 | Stop reason: SURG

## 2018-08-01 RX ORDER — SUCCINYLCHOLINE CHLORIDE 20 MG/ML
INJECTION INTRAMUSCULAR; INTRAVENOUS AS NEEDED
Status: DISCONTINUED | OUTPATIENT
Start: 2018-08-01 | End: 2018-08-01 | Stop reason: SURG

## 2018-08-01 RX ORDER — IPRATROPIUM BROMIDE AND ALBUTEROL SULFATE 2.5; .5 MG/3ML; MG/3ML
3 SOLUTION RESPIRATORY (INHALATION) ONCE AS NEEDED
Status: DISCONTINUED | OUTPATIENT
Start: 2018-08-01 | End: 2018-08-01 | Stop reason: HOSPADM

## 2018-08-01 RX ORDER — SODIUM CHLORIDE, SODIUM LACTATE, POTASSIUM CHLORIDE, CALCIUM CHLORIDE 600; 310; 30; 20 MG/100ML; MG/100ML; MG/100ML; MG/100ML
100 INJECTION, SOLUTION INTRAVENOUS CONTINUOUS
Status: DISCONTINUED | OUTPATIENT
Start: 2018-08-01 | End: 2018-08-01

## 2018-08-01 RX ORDER — MAGNESIUM HYDROXIDE 1200 MG/15ML
LIQUID ORAL AS NEEDED
Status: DISCONTINUED | OUTPATIENT
Start: 2018-08-01 | End: 2018-08-01 | Stop reason: HOSPADM

## 2018-08-01 RX ORDER — LIDOCAINE HYDROCHLORIDE 40 MG/ML
SOLUTION TOPICAL AS NEEDED
Status: DISCONTINUED | OUTPATIENT
Start: 2018-08-01 | End: 2018-08-01 | Stop reason: SURG

## 2018-08-01 RX ORDER — SCOLOPAMINE TRANSDERMAL SYSTEM 1 MG/1
1 PATCH, EXTENDED RELEASE TRANSDERMAL ONCE
Status: DISCONTINUED | OUTPATIENT
Start: 2018-08-01 | End: 2018-08-02

## 2018-08-01 RX ORDER — LIDOCAINE HYDROCHLORIDE 20 MG/ML
INJECTION, SOLUTION INFILTRATION; PERINEURAL AS NEEDED
Status: DISCONTINUED | OUTPATIENT
Start: 2018-08-01 | End: 2018-08-01 | Stop reason: SURG

## 2018-08-01 RX ORDER — ACETAMINOPHEN 500 MG
1000 TABLET ORAL ONCE
Status: COMPLETED | OUTPATIENT
Start: 2018-08-01 | End: 2018-08-01

## 2018-08-01 RX ORDER — MEPERIDINE HYDROCHLORIDE 50 MG/ML
12.5 INJECTION INTRAMUSCULAR; INTRAVENOUS; SUBCUTANEOUS
Status: DISCONTINUED | OUTPATIENT
Start: 2018-08-01 | End: 2018-08-01 | Stop reason: HOSPADM

## 2018-08-01 RX ORDER — DEXAMETHASONE SODIUM PHOSPHATE 4 MG/ML
4 INJECTION, SOLUTION INTRA-ARTICULAR; INTRALESIONAL; INTRAMUSCULAR; INTRAVENOUS; SOFT TISSUE ONCE AS NEEDED
Status: COMPLETED | OUTPATIENT
Start: 2018-08-01 | End: 2018-08-01

## 2018-08-01 RX ORDER — FAMOTIDINE 10 MG/ML
20 INJECTION, SOLUTION INTRAVENOUS
Status: DISCONTINUED | OUTPATIENT
Start: 2018-08-01 | End: 2018-08-01 | Stop reason: HOSPADM

## 2018-08-01 RX ORDER — MORPHINE SULFATE 2 MG/ML
2 INJECTION, SOLUTION INTRAMUSCULAR; INTRAVENOUS
Status: DISCONTINUED | OUTPATIENT
Start: 2018-08-01 | End: 2018-08-01 | Stop reason: HOSPADM

## 2018-08-01 RX ORDER — MIDAZOLAM HYDROCHLORIDE 1 MG/ML
1 INJECTION INTRAMUSCULAR; INTRAVENOUS
Status: DISCONTINUED | OUTPATIENT
Start: 2018-08-01 | End: 2018-08-01 | Stop reason: HOSPADM

## 2018-08-01 RX ORDER — FLUMAZENIL 0.1 MG/ML
0.2 INJECTION INTRAVENOUS AS NEEDED
Status: DISCONTINUED | OUTPATIENT
Start: 2018-08-01 | End: 2018-08-01 | Stop reason: HOSPADM

## 2018-08-01 RX ORDER — ROCURONIUM BROMIDE 10 MG/ML
INJECTION, SOLUTION INTRAVENOUS AS NEEDED
Status: DISCONTINUED | OUTPATIENT
Start: 2018-08-01 | End: 2018-08-01 | Stop reason: SURG

## 2018-08-01 RX ORDER — SODIUM CHLORIDE, SODIUM LACTATE, POTASSIUM CHLORIDE, CALCIUM CHLORIDE 600; 310; 30; 20 MG/100ML; MG/100ML; MG/100ML; MG/100ML
20 INJECTION, SOLUTION INTRAVENOUS CONTINUOUS
Status: DISCONTINUED | OUTPATIENT
Start: 2018-08-01 | End: 2018-08-01

## 2018-08-01 RX ORDER — HYDRALAZINE HYDROCHLORIDE 20 MG/ML
5 INJECTION INTRAMUSCULAR; INTRAVENOUS
Status: DISCONTINUED | OUTPATIENT
Start: 2018-08-01 | End: 2018-08-01 | Stop reason: HOSPADM

## 2018-08-01 RX ORDER — VASOPRESSIN 20 U/ML
INJECTION PARENTERAL AS NEEDED
Status: DISCONTINUED | OUTPATIENT
Start: 2018-08-01 | End: 2018-08-01 | Stop reason: SURG

## 2018-08-01 RX ORDER — SODIUM CHLORIDE 0.9 % (FLUSH) 0.9 %
1-10 SYRINGE (ML) INJECTION AS NEEDED
Status: DISCONTINUED | OUTPATIENT
Start: 2018-08-01 | End: 2018-08-01 | Stop reason: HOSPADM

## 2018-08-01 RX ORDER — ONDANSETRON 2 MG/ML
4 INJECTION INTRAMUSCULAR; INTRAVENOUS AS NEEDED
Status: DISCONTINUED | OUTPATIENT
Start: 2018-08-01 | End: 2018-08-01 | Stop reason: HOSPADM

## 2018-08-01 RX ORDER — DEXAMETHASONE SODIUM PHOSPHATE 4 MG/ML
INJECTION, SOLUTION INTRA-ARTICULAR; INTRALESIONAL; INTRAMUSCULAR; INTRAVENOUS; SOFT TISSUE AS NEEDED
Status: DISCONTINUED | OUTPATIENT
Start: 2018-08-01 | End: 2018-08-01 | Stop reason: SURG

## 2018-08-01 RX ORDER — SUFENTANIL CITRATE 50 UG/ML
INJECTION EPIDURAL; INTRAVENOUS AS NEEDED
Status: DISCONTINUED | OUTPATIENT
Start: 2018-08-01 | End: 2018-08-01 | Stop reason: SURG

## 2018-08-01 RX ADMIN — CEFAZOLIN 1 G: 330 INJECTION, POWDER, FOR SOLUTION INTRAMUSCULAR; INTRAVENOUS at 16:01

## 2018-08-01 RX ADMIN — ROCURONIUM BROMIDE 5 MG: 10 INJECTION INTRAVENOUS at 09:07

## 2018-08-01 RX ADMIN — PROPOFOL 150 MG: 10 INJECTION, EMULSION INTRAVENOUS at 09:07

## 2018-08-01 RX ADMIN — DEXAMETHASONE SODIUM PHOSPHATE 4 MG: 4 INJECTION, SOLUTION INTRAMUSCULAR; INTRAVENOUS at 09:25

## 2018-08-01 RX ADMIN — FAMOTIDINE 20 MG: 10 INJECTION, SOLUTION INTRAVENOUS at 08:31

## 2018-08-01 RX ADMIN — VASOPRESSIN 2 UNITS: 20 INJECTION INTRAVENOUS at 09:25

## 2018-08-01 RX ADMIN — IRON SUCROSE 200 MG: 20 INJECTION, SOLUTION INTRAVENOUS at 12:16

## 2018-08-01 RX ADMIN — LIDOCAINE HYDROCHLORIDE 1 EACH: 40 SOLUTION TOPICAL at 09:07

## 2018-08-01 RX ADMIN — SUCCINYLCHOLINE CHLORIDE 140 MG: 20 INJECTION, SOLUTION INTRAMUSCULAR; INTRAVENOUS at 09:07

## 2018-08-01 RX ADMIN — VASOPRESSIN 2 UNITS: 20 INJECTION INTRAVENOUS at 09:10

## 2018-08-01 RX ADMIN — SUFENTANIL CITRATE 50 MCG: 50 INJECTION, SOLUTION EPIDURAL; INTRAVENOUS at 09:07

## 2018-08-01 RX ADMIN — LIDOCAINE HYDROCHLORIDE 100 MG: 20 INJECTION, SOLUTION INFILTRATION; PERINEURAL at 09:07

## 2018-08-01 RX ADMIN — DEXAMETHASONE SODIUM PHOSPHATE 4 MG: 4 INJECTION, SOLUTION INTRA-ARTICULAR; INTRALESIONAL; INTRAMUSCULAR; INTRAVENOUS; SOFT TISSUE at 08:32

## 2018-08-01 RX ADMIN — ONDANSETRON HYDROCHLORIDE 4 MG: 2 SOLUTION INTRAMUSCULAR; INTRAVENOUS at 09:25

## 2018-08-01 RX ADMIN — MIDAZOLAM 2 MG: 1 INJECTION INTRAMUSCULAR; INTRAVENOUS at 08:31

## 2018-08-01 RX ADMIN — POLYETHYLENE GLYCOL (3350) 17 G: 17 POWDER, FOR SOLUTION ORAL at 12:17

## 2018-08-01 RX ADMIN — ACETAMINOPHEN 1000 MG: 500 TABLET, FILM COATED ORAL at 08:31

## 2018-08-01 RX ADMIN — Medication 80 MCG: at 09:40

## 2018-08-01 RX ADMIN — SODIUM CHLORIDE 75 ML/HR: 9 INJECTION, SOLUTION INTRAVENOUS at 15:59

## 2018-08-01 RX ADMIN — LOSARTAN POTASSIUM 25 MG: 25 TABLET ORAL at 12:17

## 2018-08-01 RX ADMIN — FAMOTIDINE 20 MG: 20 TABLET, FILM COATED ORAL at 12:16

## 2018-08-01 RX ADMIN — SCOPOLAMINE 1 PATCH: 1 PATCH, EXTENDED RELEASE TRANSDERMAL at 08:31

## 2018-08-01 RX ADMIN — SODIUM CHLORIDE 75 ML/HR: 9 INJECTION, SOLUTION INTRAVENOUS at 05:54

## 2018-08-01 RX ADMIN — OXYCODONE HYDROCHLORIDE AND ACETAMINOPHEN 2 TABLET: 10; 325 TABLET ORAL at 19:34

## 2018-08-01 RX ADMIN — Medication 80 MCG: at 09:30

## 2018-08-01 RX ADMIN — FAMOTIDINE 20 MG: 20 TABLET, FILM COATED ORAL at 19:58

## 2018-08-01 RX ADMIN — POLYETHYLENE GLYCOL (3350) 17 G: 17 POWDER, FOR SOLUTION ORAL at 19:58

## 2018-08-01 RX ADMIN — Medication 1 TABLET: at 12:17

## 2018-08-01 RX ADMIN — Medication 1 G: at 09:10

## 2018-08-01 RX ADMIN — SODIUM CHLORIDE, POTASSIUM CHLORIDE, SODIUM LACTATE AND CALCIUM CHLORIDE 20 ML/HR: 600; 310; 30; 20 INJECTION, SOLUTION INTRAVENOUS at 08:37

## 2018-08-01 RX ADMIN — ATORVASTATIN CALCIUM 10 MG: 10 TABLET, FILM COATED ORAL at 19:58

## 2018-08-01 RX ADMIN — SODIUM CHLORIDE, POTASSIUM CHLORIDE, SODIUM LACTATE AND CALCIUM CHLORIDE 100 ML/HR: 600; 310; 30; 20 INJECTION, SOLUTION INTRAVENOUS at 08:36

## 2018-08-01 RX ADMIN — Medication 1000 MCG: at 12:16

## 2018-08-01 RX ADMIN — EPHEDRINE SULFATE 10 MG: 50 INJECTION INTRAMUSCULAR; INTRAVENOUS; SUBCUTANEOUS at 09:47

## 2018-08-01 NOTE — ANESTHESIA POSTPROCEDURE EVALUATION
"Patient: Nikki Hutchins    Procedure Summary     Date:  08/01/18 Room / Location:   [facility] OR  /  [facility] OR    Anesthesia Start:  0903 Anesthesia Stop:  1125    Procedure:  POSTERIOR SPINAL FUSION WITH INSTRUMENTATION L4-S1, POSSIBLE LEFT L5-S1 HEMILAMINECTOMY, FACETECTOMY DECOMPRESSION, TRANSFORAMINAL LUMBAR INTERBODY FUSION (N/A Spine Lumbar) Diagnosis:  (M54.16)    Surgeon:  RICARDO Parr MD Provider:  Padmaja Ratliff CRNA    Anesthesia Type:  general ASA Status:  2          Anesthesia Type: general  Last vitals  BP   140/75 (08/01/18 1205)   Temp   98.6 °F (37 °C) (08/01/18 1205)   Pulse   97 (08/01/18 1205)   Resp   14 (08/01/18 1205)     SpO2   98 % (08/01/18 1205)     Post Anesthesia Care and Evaluation    PONV Status: none  Comments: Patient d/c from PACU prior to anes eval based on Hanna score.  Please see RN notes for details of d/c criteria.    Blood pressure 140/75, pulse 97, temperature 98.6 °F (37 °C), temperature source Oral, resp. rate 14, height 152 cm (59.84\"), weight 53.2 kg (117 lb 4.6 oz), SpO2 98 %.          "

## 2018-08-01 NOTE — PLAN OF CARE
Problem: Patient Care Overview  Goal: Plan of Care Review  Outcome: Ongoing (interventions implemented as appropriate)   08/01/18 8608   Coping/Psychosocial   Plan of Care Reviewed With patient   Plan of Care Review   Progress improving   OTHER   Outcome Summary Pt back from OR this afternoon, pt slightly drowsy, no c/o pain. Pt back incision C/D/I. no drainage noted. Pt up OOB to BR multiple times with brace without difficulty. Pt having to void VERY frequently. No N/V.       Problem: Fall Risk (Adult)  Goal: Absence of Fall  Outcome: Ongoing (interventions implemented as appropriate)      Problem: Laminectomy/Foraminotomy/Discectomy (Adult)  Goal: Signs and Symptoms of Listed Potential Problems Will be Absent, Minimized or Managed (Laminectomy/Foraminotomy/Discectomy)  Outcome: Ongoing (interventions implemented as appropriate)      Problem: Skin Injury Risk (Adult)  Goal: Identify Related Risk Factors and Signs and Symptoms  Outcome: Outcome(s) achieved Date Met: 08/01/18    Goal: Skin Health and Integrity  Outcome: Ongoing (interventions implemented as appropriate)

## 2018-08-01 NOTE — PROGRESS NOTES
[facility]  Daily Progress Note  Nikki Hutchins  MRN: [id number] LOS: 2    Admit Date: 7/30/2018 8/1/2018 7:22 AM    Subjective:          Chief Complaint:  BACK PAIN    Interval History:    Reviewed overnight events and nursing notes.   Status:  STABLE  Pain:  some relief        ROS:  10 point ROS obtained:   Gen: No fevers  HEENT: No migraine or visual change  Lung: No cough, hemoptysis or wheezing  Cv: No chest pain or pressure  Abd: No nausea or vomiting, no change in bowel function, no gi bleeding  Ext: No increased pain or swelling  Neuro: No seizure or syncope  Skin: No new rashes  Endocrine: No polyuria, polyphagia or polydipsia  Psych: No increased anxiety or depression  MS: No increase in joint pain or swelling reported    DIET:  NPO Diet    Medications:     sodium chloride 75 mL/hr Last Rate: 75 mL/hr (08/01/18 0554)       atorvastatin 10 mg Oral Nightly   calcium carb-cholecalciferol 1 tablet Oral Daily   ceFAZolin 1 g Intravenous Once   famotidine 20 mg Intravenous Q12H   Or      famotidine 20 mg Oral Q12H   losartan 25 mg Oral Daily   polyethylene glycol 17 g Oral BID   vitamin B-12 1,000 mcg Oral Daily       Data:     Code Status:   Code Status and Medical Interventions:   Ordered at: 07/30/18 155     Level Of Support Discussed With:    Patient     Code Status:    CPR     Medical Interventions (Level of Support Prior to Arrest):    Full       Family History   Problem Relation Age of Onset   • Arthritis Father    • Hypertension Brother      Social History     Social History   • Marital status:      Spouse name: N/A   • Number of children: N/A   • Years of education: N/A     Occupational History   • Not on file.     Social History Main Topics   • Smoking status: Never Smoker   • Smokeless tobacco: Never Used   • Alcohol use No   • Drug use: No   • Sexual activity: Defer     Other Topics Concern   • Not on file     Social History Narrative   • No narrative on file        Labs:    Lab Results (last 72 hours)     Procedure Component Value Units Date/Time    Vitamin B12 [[id number]]  (Abnormal) Collected:  08/01/18 0603    Specimen:  Blood Updated:  08/01/18 0720     Vitamin B-12 >1,000 (H) pg/mL     Iron Profile [[id number]]  (Abnormal) Collected:  08/01/18 0603    Specimen:  Blood Updated:  08/01/18 0640     Iron 15 (L) mcg/dL      TIBC 315 mcg/dL      Iron Saturation 5 (L) %     Basic Metabolic Panel [[id number]]  (Abnormal) Collected:  07/31/18 0503    Specimen:  Blood Updated:  07/31/18 0529     Glucose 139 (H) mg/dL      BUN 14 mg/dL      Creatinine 0.60 mg/dL      Sodium 138 mmol/L      Potassium 4.8 mmol/L      Chloride 101 mmol/L      CO2 24.0 mmol/L      Calcium 8.9 mg/dL      eGFR   Amer 124 mL/min/1.73      eGFR Non African Amer 102 mL/min/1.73      BUN/Creatinine Ratio 23.3     Anion Gap 13.0 mmol/L     Narrative:       GFR Normal >60  Chronic Kidney Disease <60  Kidney Failure <15    CBC & Differential [[id number]] Collected:  07/31/18 0503    Specimen:  Blood Updated:  07/31/18 0520    Narrative:       The following orders were created for panel order CBC & Differential.  Procedure                               Abnormality         Status                     ---------                               -----------         ------                     CBC Auto Differential[[id number]]        Abnormal            Final result                 Please view results for these tests on the individual orders.    CBC Auto Differential [[id number]]  (Abnormal) Collected:  07/31/18 0503    Specimen:  Blood Updated:  07/31/18 0520     WBC 10.26 10*3/mm3      RBC 3.77 (L) 10*6/mm3      Hemoglobin 10.9 (L) g/dL      Hematocrit 33.5 (L) %      MCV 88.9 fL      MCH 28.9 pg      MCHC 32.5 (L) g/dL      RDW 12.6 %      RDW-SD 41.1 fl      MPV 8.4 fL      Platelets 247 10*3/mm3      Neutrophil % 85.3 (H) %      Lymphocyte % 6.7 (L) %      Monocyte % 7.6 %      Eosinophil % 0.0 %      Basophil  "% 0.0 %      Immature Grans % 0.4 %      Neutrophils, Absolute 8.75 (H) 10*3/mm3      Lymphocytes, Absolute 0.69 (L) 10*3/mm3      Monocytes, Absolute 0.78 10*3/mm3      Eosinophils, Absolute 0.00 10*3/mm3      Basophils, Absolute 0.00 10*3/mm3      Immature Grans, Absolute 0.04 (H) 10*3/mm3      nRBC 0.0 /100 WBC             Objective:     Vitals: /62 (BP Location: Right arm, Patient Position: Lying)   Pulse 93   Temp 99 °F (37.2 °C) (Oral)   Resp 19   Ht 152 cm (59.84\")   Wt 53.2 kg (117 lb 4.6 oz)   SpO2 97%   BMI 23.03 kg/m²    Intake/Output Summary (Last 24 hours) at 18 0722  Last data filed at 18 0556   Gross per 24 hour   Intake                0 ml   Output             2500 ml   Net            -2500 ml    Temp (24hrs), Av.1 °F (36.7 °C), Min:97.3 °F (36.3 °C), Max:99 °F (37.2 °C)        Physical Examination:   General appearance - alert, well appearing, and in no distress and oriented to person, place, and time  Mental status - alert, oriented to person, place, and time, normal mood, behavior, speech, dress, motor activity, and thought processes  Eyes - pupils equal and reactive, extraocular eye movements intact  Ears - bilateral TM's and external ear canals normal, hearing grossly normal bilaterally  Mouth - mucous membranes moist, pharynx normal without lesions  Neck - supple, no significant adenopathy  Lymphatics - no palpable lymphadenopathy, no hepatosplenomegaly  Chest - clear to auscultation, no wheezes, rales or rhonchi, symmetric air entry, no tachypnea, retractions or cyanosis  Heart - normal rate, regular rhythm, normal S1, S2, no murmurs, rubs, clicks or gallops  Abdomen - soft, nontender, nondistended, no masses or organomegaly bowel sounds normal  Neurological - alert, oriented, normal speech, no focal findings or movement disorder noted  Musculoskeletal - no joint tenderness, deformity or swelling  Extremities - peripheral pulses normal, no pedal edema, no clubbing " or cyanosis  Skin - normal coloration and turgor, no rashes, no suspicious skin lesions noted      Assessment and Plan:     Primary Problem:  <principal problem not specified>    Hospital Problem list:  Active Problems:    Lumbar stenosis    Essential hypertension    Pure hypercholesterolemia    Drug-induced constipation      PMH:  Past Medical History:   Diagnosis Date   • Arthritis    • Elevated cholesterol    • Hypertension    • Incontinence of urine        Treatment Plan:  Active Problems:    Lumbar stenosis    Essential hypertension    Pure hypercholesterolemia    Drug-induced constipation        Anemia post-op expected-check iron, B12,and folate. Replenish as needed.Transfuse at acceptable levels depending on clinical judgement and comorbidities. Recheck in AM     Constipation-start with Miralax 1 capful BID until BM, then decrease to 1 x a day,then can step up to Amitiza 24 mcg po BID which can be used for opioid induced constipation,and ultimately end up with Relistor 12 mcg subq every 48 hours to block the effect of narcotics on the gut.        Hypertension-review pre and post BP's,will restart home BP meds when BP allows. Add clonidine 0.1 mg every 4 hours prn if bp> 140/90,monitor BP and adjust meds as necessary.        GERD-exacerbated by pain meds and anesthesia, will add PPI as needed and can step up to Carafate 1 gm po q AC and nightly.      Discharge planning:   Home    Reviewed treatment plans with the patient and/or family.   20 minutes spent in face to face interaction and coordination of care.     Electronically signed by Ezra Telles MD on 8/1/2018 at 7:22 AM

## 2018-08-01 NOTE — ANESTHESIA PREPROCEDURE EVALUATION
Anesthesia Evaluation     Patient summary reviewed and Nursing notes reviewed   history of anesthetic complications: PONV  NPO Solid Status: > 8 hours  NPO Liquid Status: > 8 hours           Airway   Mallampati: I  TM distance: >3 FB  Neck ROM: full  No difficulty expected  Dental      Pulmonary - negative pulmonary ROS    breath sounds clear to auscultation  Cardiovascular   Exercise tolerance: good (4-7 METS)    ECG reviewed  Rhythm: regular  Rate: normal    (+) hypertension, hyperlipidemia,       Neuro/Psych  (+) numbness,     GI/Hepatic/Renal/Endo - negative ROS     Musculoskeletal     Abdominal    Substance History - negative use     OB/GYN negative ob/gyn ROS         Other   (+) arthritis                       Anesthesia Plan    ASA 2     general     intravenous induction   Anesthetic plan and risks discussed with patient.

## 2018-08-01 NOTE — PLAN OF CARE
Problem: Patient Care Overview  Goal: Plan of Care Review  Outcome: Ongoing (interventions implemented as appropriate)   08/01/18 0335   Coping/Psychosocial   Plan of Care Reviewed With patient   Plan of Care Review   Progress no change   OTHER   Outcome Summary Pt c/o pain and PRN pain meds given w/ benadryl given for some mild itching. Pt. voiding many times an hour at the beginning of the shift. Mepilex x2 on right flank CDI.,Part 2 today, consent signed, NPO since midnight, HIPPA cleanse done and linen changed. VSS. Safety maintained.       Problem: Fall Risk (Adult)  Goal: Absence of Fall  Outcome: Ongoing (interventions implemented as appropriate)      Problem: Laminectomy/Foraminotomy/Discectomy (Adult)  Goal: Signs and Symptoms of Listed Potential Problems Will be Absent, Minimized or Managed (Laminectomy/Foraminotomy/Discectomy)  Outcome: Ongoing (interventions implemented as appropriate)      Problem: Skin Injury Risk (Adult)  Goal: Identify Related Risk Factors and Signs and Symptoms  Outcome: Ongoing (interventions implemented as appropriate)    Goal: Skin Health and Integrity  Outcome: Ongoing (interventions implemented as appropriate)

## 2018-08-01 NOTE — OP NOTE
Posterior lumbar fusion with instrumentation Procedure Note    Nikki Hutchins  8/1/2018    Pre-op Diagnosis:     1.  Increasing chronic back pain  2.  Bilateral buttock, thigh, and leg radiculopathy  3.  Bilateral foot numbness, tingling  4.  Neurogenic claudication  5.  Severe degenerative disc disease L4 to S1  6.  Degenerative lumbar scoliosis L4 to S1  7.  Spondylolisthesis with instability L4-5  8.  Retrolisthesis with instability L5-S1  9.  Severe facet arthropathy L4 to S1  10.  Central and bilateral foraminal stenosis L4 to S1  11.  Transitional lumbosacral anatomy  12.  Status post right LLIF with instrumentation L4 to S1, 7/30/2018    Post-op Diagnosis:     same     Procedure/CPT® Codes:     1.  Posterior spinal fusion L4-5, L5-S1  2.  Posterior spinal instrumentation L4-S1 ([employer] pedicle screws and rods)  3.  Use of locally obtained autograft bone for fusion  4.  Use of fluoroscopy for confirmation of surgical level and placement of instrumentation  5.  Intraoperative neural monitoring with pedicle screw stimulation     Anesthesia: General     Surgeon: ELVA Parr MD     Assistant:  Bhargav Wilson PA-C     Estimated Blood Loss: minimal     Complications: None     Condition: Stable to PACU.     Indications:     The patient is a 57-year-old who sees Dr. Robi Tom for medical issues.  She presented to the office with complaints of increasing chronic back pain, bilateral buttock, thigh, and leg radiculopathy as well as bilateral foot numbness and tingling.  Her symptoms were very consistent with neurogenic claudication.  Imaging studies revealed severe degenerative disc disease at both L4-5 and L5-S1 along with a degenerative scoliosis.  There was spondylolisthesis with instability L4-5 and there was retrolisthesis at L5-S1.  Both levels had severe facet arthropathy also contributing to central and bilateral foraminal stenosis.  The patient was noted to have transitional anatomy with  possibly four mobile lumbar vertebral bodies, although for counting purposes based on the MRI report the lowest mobile segment was labeled L5-S1.      After failing all conservative measures, it was mutually decided that surgery would be the best option. Risks, benefits, and complications of surgery were discussed in detail. The patient appeared well informed and wished to proceed. We specifically discussed the risk of infection, blood loss, nerve root injury, CSF leak, and the possibility of incomplete resolution of symptoms. We also discussed the possible risk of a nonunion and the potential need for additional surgery in the event of a pseudoarthrosis or hardware failure.      We elected proceed with a staged operation.  Previously on 7/30/2018, the patient underwent a right lateral fusion of L4-5 and L5-S1 with instrumentation.  Again the patient had transitional anatomy making it possible for us to reach her lowest spinal segment through a lateral approach.  Today we return to surgery for the second posterior stage the procedure involving a posterior fusion with instrumentation.     Operative Procedure:     After obtaining informed consent and verifying the correct operative site, the patient was brought to the operating room. A general anesthetic was provided by the anesthesia service with the assistance of an endotracheal tube. Once this was appropriately positioned and secured, the patient was carefully rotated prone onto a Landry frame. All bony processes were well-padded. The lumbar region was prepped and draped in usual sterile fashion. A surgical timeout was taken to confirm this was the correct patient, we were working at the correct levels, and that preoperative antibiotics were given in a timely fashion.      Using fluoroscopy for guidance, a left sided Wiltsey incision was created overlying L4 to S1 using a 10 blade scalpel.  Dissection was carried through subcutaneous tissues using Bovie cautery.   The lumbar fascia was divided in line with the incision and blunt dissection was carried between the multifidus and the longissimus down to the facet joints of L4-5 and L5-S1.  Dissection was carried laterally exposing the transverse processes of L4, L5, and the sacral ala.  We then exposed the facet joints further.  The capsules were destroyed using Bovie cautery exposing as much bone as possible.  The high-speed bur was then used to decorticate the facet joints, destroying as much of the facet cartilage as possible.  I also decorticated the lateral pars region and the tranverse processes.  The locally obtained autograft bone was left in situ in the posterolateral gutter.  This constituted a posterior fusion of L4-5 and L5-S1.      Next under fluoroscopic guidance, Jamshidi needles were used to cannulate the pedicles of L4, L5, and S1.  Once the needles were properly positioned in the pedicles, guidewires were placed to maintain position.  Over each of the guidewires, an [employer] pedicle screw was placed.  We used 5.5 mm x 45 mm screws into each of the pedicles.  I then used a neuro monitoring probe to stimulate the screws themselves confirming appropriate position ensuring no breach of the pedicles.  After confirming the screws were properly positioned, an appropriate-sized tian was chosen to span the pedicle screws.  The tian was tightened into position using set screws.  The setscrews were tightened using the appropriate antitorque wrench and torque limiting screwdriver provided by [employer].     Next again under fluoroscopic guidance, I created stab incisions over the pedicles on the right side at L4 and S1.  Through these incisions I used the Jamshidi needles to cannulate the pedicles.  Once properly positioned, I placed guidewires to maintain position.  I then placed a 5.5 mm x 45 mm screws into each of these pedicles.  I then used the neuro monitoring probe to stability to screws again ensuring that they were  properly positioned.  A second tian was chosen and passed under the musculature.  This was held into position using set screws tightened in the same fashion using the antitorque wrench and torque loading screwdriver again provided by [facility].      The wounds were then thoroughly irrigated and an inspection was then undertaken to ensure that we had adequate hemostasis.  Bleeding at this point was controlled using thrombin with Gelfoam powder and bipolar cautery.  Final fluoroscopy imaging confirmed adequate position of the newly placed posterior instrumentation spanning L4 to S1.      Wound closure was then accomplished by reapproximating the fascia with a #1 Vicryl area immediate subcutaneous tissues were closed using a 2-0 Vicryl and skin closure was augmented using Mastisol and Steri-Strips.  The incisions were then washed and sterilely dressed with Bioclusive dressings.      The patient was then carefully rotated supine onto a hospital gurney, extubated, and sent to the recovery room in good stable condition.  The patient tolerated the procedure well.  There were no complications.  We estimated blood loss to be minimal.      Bhargav Wilson PA-C provided critical assistance during the procedure.  His assistance was medically necessary in order to allow the procedure to occur in the most safe and efficient manner.    ELVA Parr MD     Date: 8/1/2018  Time: 10:44 AM

## 2018-08-01 NOTE — ANESTHESIA PROCEDURE NOTES
Airway  Urgency: elective    Airway not difficult    General Information and Staff    Patient location during procedure: OR  CRNA: CASSIE SMILEY    Indications and Patient Condition  Indications for airway management: airway protection    Preoxygenated: yes  MILS maintained throughout  Mask difficulty assessment: 1 - vent by mask    Final Airway Details  Final airway type: endotracheal airway      Successful airway: ETT  Cuffed: yes   Successful intubation technique: direct laryngoscopy  Facilitating devices/methods: intubating stylet  Endotracheal tube insertion site: oral  Blade: Zacarias  Blade size: #2  ETT size: 7.0 mm  Cormack-Lehane Classification: grade I - full view of glottis  Placement verified by: chest auscultation, capnometry and palpation of cuff   Cuff volume (mL): 8  Measured from: gums  ETT to gums (cm): 21  Number of attempts at approach: 1

## 2018-08-02 VITALS
HEIGHT: 60 IN | RESPIRATION RATE: 16 BRPM | WEIGHT: 117.28 LBS | SYSTOLIC BLOOD PRESSURE: 118 MMHG | HEART RATE: 108 BPM | TEMPERATURE: 98 F | DIASTOLIC BLOOD PRESSURE: 72 MMHG | OXYGEN SATURATION: 99 % | BODY MASS INDEX: 23.03 KG/M2

## 2018-08-02 LAB
ANION GAP SERPL CALCULATED.3IONS-SCNC: 11 MMOL/L (ref 4–13)
BASOPHILS # BLD AUTO: 0.03 10*3/MM3 (ref 0–0.2)
BASOPHILS NFR BLD AUTO: 0.3 % (ref 0–2)
BUN BLD-MCNC: 10 MG/DL (ref 5–21)
BUN/CREAT SERPL: 17.2 (ref 7–25)
CALCIUM SPEC-SCNC: 9.1 MG/DL (ref 8.4–10.4)
CHLORIDE SERPL-SCNC: 102 MMOL/L (ref 98–110)
CO2 SERPL-SCNC: 29 MMOL/L (ref 24–31)
CREAT BLD-MCNC: 0.58 MG/DL (ref 0.5–1.4)
DEPRECATED RDW RBC AUTO: 41.4 FL (ref 40–54)
EOSINOPHIL # BLD AUTO: 0 10*3/MM3 (ref 0–0.7)
EOSINOPHIL NFR BLD AUTO: 0 % (ref 0–4)
ERYTHROCYTE [DISTWIDTH] IN BLOOD BY AUTOMATED COUNT: 13 % (ref 12–15)
GFR SERPL CREATININE-BSD FRML MDRD: 106 ML/MIN/1.73
GFR SERPL CREATININE-BSD FRML MDRD: 129 ML/MIN/1.73
GLUCOSE BLD-MCNC: 113 MG/DL (ref 70–100)
HCT VFR BLD AUTO: 29.7 % (ref 37–47)
HGB BLD-MCNC: 9.6 G/DL (ref 12–16)
IMM GRANULOCYTES # BLD: 0.04 10*3/MM3 (ref 0–0.03)
IMM GRANULOCYTES NFR BLD: 0.4 % (ref 0–5)
LYMPHOCYTES # BLD AUTO: 1.41 10*3/MM3 (ref 0.72–4.86)
LYMPHOCYTES NFR BLD AUTO: 14.4 % (ref 15–45)
MCH RBC QN AUTO: 28.2 PG (ref 28–32)
MCHC RBC AUTO-ENTMCNC: 32.3 G/DL (ref 33–36)
MCV RBC AUTO: 87.4 FL (ref 82–98)
MONOCYTES # BLD AUTO: 0.73 10*3/MM3 (ref 0.19–1.3)
MONOCYTES NFR BLD AUTO: 7.5 % (ref 4–12)
NEUTROPHILS # BLD AUTO: 7.58 10*3/MM3 (ref 1.87–8.4)
NEUTROPHILS NFR BLD AUTO: 77.4 % (ref 39–78)
NRBC BLD MANUAL-RTO: 0 /100 WBC (ref 0–0)
PLATELET # BLD AUTO: 222 10*3/MM3 (ref 130–400)
PMV BLD AUTO: 9 FL (ref 6–12)
POTASSIUM BLD-SCNC: 3.5 MMOL/L (ref 3.5–5.3)
RBC # BLD AUTO: 3.4 10*6/MM3 (ref 4.2–5.4)
SODIUM BLD-SCNC: 142 MMOL/L (ref 135–145)
WBC NRBC COR # BLD: 9.79 10*3/MM3 (ref 4.8–10.8)

## 2018-08-02 PROCEDURE — 94760 N-INVAS EAR/PLS OXIMETRY 1: CPT

## 2018-08-02 PROCEDURE — 25010000003 CEFAZOLIN PER 500 MG: Performed by: ORTHOPAEDIC SURGERY

## 2018-08-02 PROCEDURE — 97165 OT EVAL LOW COMPLEX 30 MIN: CPT | Performed by: OCCUPATIONAL THERAPIST

## 2018-08-02 PROCEDURE — 25010000002 IRON SUCROSE PER 1 MG: Performed by: FAMILY MEDICINE

## 2018-08-02 PROCEDURE — G8978 MOBILITY CURRENT STATUS: HCPCS | Performed by: PHYSICAL THERAPIST

## 2018-08-02 PROCEDURE — G8979 MOBILITY GOAL STATUS: HCPCS | Performed by: PHYSICAL THERAPIST

## 2018-08-02 PROCEDURE — G8989 SELF CARE D/C STATUS: HCPCS | Performed by: OCCUPATIONAL THERAPIST

## 2018-08-02 PROCEDURE — G8987 SELF CARE CURRENT STATUS: HCPCS | Performed by: OCCUPATIONAL THERAPIST

## 2018-08-02 PROCEDURE — G8980 MOBILITY D/C STATUS: HCPCS | Performed by: PHYSICAL THERAPIST

## 2018-08-02 PROCEDURE — 94799 UNLISTED PULMONARY SVC/PX: CPT

## 2018-08-02 PROCEDURE — 85025 COMPLETE CBC W/AUTO DIFF WBC: CPT | Performed by: FAMILY MEDICINE

## 2018-08-02 PROCEDURE — G8988 SELF CARE GOAL STATUS: HCPCS | Performed by: OCCUPATIONAL THERAPIST

## 2018-08-02 PROCEDURE — 80048 BASIC METABOLIC PNL TOTAL CA: CPT | Performed by: FAMILY MEDICINE

## 2018-08-02 PROCEDURE — 97161 PT EVAL LOW COMPLEX 20 MIN: CPT | Performed by: PHYSICAL THERAPIST

## 2018-08-02 RX ADMIN — OXYCODONE HYDROCHLORIDE AND ACETAMINOPHEN 2 TABLET: 10; 325 TABLET ORAL at 09:32

## 2018-08-02 RX ADMIN — LOSARTAN POTASSIUM 25 MG: 25 TABLET ORAL at 09:20

## 2018-08-02 RX ADMIN — OXYCODONE HYDROCHLORIDE AND ACETAMINOPHEN 2 TABLET: 10; 325 TABLET ORAL at 04:09

## 2018-08-02 RX ADMIN — POLYETHYLENE GLYCOL (3350) 17 G: 17 POWDER, FOR SOLUTION ORAL at 09:19

## 2018-08-02 RX ADMIN — CEFAZOLIN 1 G: 330 INJECTION, POWDER, FOR SOLUTION INTRAMUSCULAR; INTRAVENOUS at 00:20

## 2018-08-02 RX ADMIN — CEFAZOLIN 1 G: 330 INJECTION, POWDER, FOR SOLUTION INTRAMUSCULAR; INTRAVENOUS at 09:19

## 2018-08-02 RX ADMIN — FAMOTIDINE 20 MG: 20 TABLET, FILM COATED ORAL at 09:20

## 2018-08-02 RX ADMIN — Medication 1000 MCG: at 09:19

## 2018-08-02 RX ADMIN — IRON SUCROSE 200 MG: 20 INJECTION, SOLUTION INTRAVENOUS at 09:19

## 2018-08-02 NOTE — PLAN OF CARE
Problem: Patient Care Overview  Goal: Plan of Care Review  Outcome: Outcome(s) achieved Date Met: 08/02/18 08/02/18 1202   Coping/Psychosocial   Plan of Care Reviewed With patient   Plan of Care Review   Progress improving   OTHER   Outcome Summary Patient to be discharged home today     Goal: Individualization and Mutuality  Outcome: Outcome(s) achieved Date Met: 08/02/18    Goal: Discharge Needs Assessment  Outcome: Outcome(s) achieved Date Met: 08/02/18    Goal: Interprofessional Rounds/Family Conf  Outcome: Outcome(s) achieved Date Met: 08/02/18      Problem: Fall Risk (Adult)  Goal: Absence of Fall  Outcome: Outcome(s) achieved Date Met: 08/02/18      Problem: Laminectomy/Foraminotomy/Discectomy (Adult)  Goal: Signs and Symptoms of Listed Potential Problems Will be Absent, Minimized or Managed (Laminectomy/Foraminotomy/Discectomy)  Outcome: Outcome(s) achieved Date Met: 08/02/18      Problem: Skin Injury Risk (Adult)  Goal: Skin Health and Integrity  Outcome: Outcome(s) achieved Date Met: 08/02/18

## 2018-08-02 NOTE — THERAPY DISCHARGE NOTE
Acute Care - Occupational Therapy Initial Eval/Discharge   Jemma     Patient Name: Nikki Hutchins  : 1960  MRN: [id number]  Today's Date: 2018  Onset of Illness/Injury or Date of Surgery: 18  Date of Referral to OT: 18  Referring Physician: Dr. Parr      Admit Date: 2018     No diagnosis found.  Patient Active Problem List   Diagnosis   • Normal body mass index (BMI)   • Tobacco non-user   • Spondylolisthesis, lumbar region   • Lumbar radiculopathy   • Lumbar stenosis   • Essential hypertension   • Pure hypercholesterolemia   • Drug-induced constipation     Past Medical History:   Diagnosis Date   • Arthritis    • Elevated cholesterol    • Hypertension    • Incontinence of urine      Past Surgical History:   Procedure Laterality Date   • BLADDER SUSPENSION     • BUNIONECTOMY Bilateral    • CATARACT EXTRACTION Bilateral    •  SECTION     • HYSTERECTOMY     • LUMBAR FUSION Right 2018    Procedure: LATERAL LUMBAR INTERBODY FUSION RIGHT L4-5,  L5-S1;  Surgeon: RICARDO Parr MD;  Location: [facility] OR;  Service: Orthopedic Spine   • LUMBAR LAMINECTOMY WITH FUSION N/A 2018    Procedure: POSTERIOR SPINAL FUSION WITH INSTRUMENTATION L4-S1, POSSIBLE LEFT L5-S1 HEMILAMINECTOMY, FACETECTOMY DECOMPRESSION, TRANSFORAMINAL LUMBAR INTERBODY FUSION;  Surgeon: RICARDO Parr MD;  Location: [facility] OR;  Service: Orthopedic Spine          OT ASSESSMENT FLOWSHEET (last 72 hours)      Occupational Therapy Evaluation     Row Name 18 0932                   OT Evaluation Time/Intention    Subjective Information complains of;pain  -MM        Document Type evaluation  -MM        Mode of Treatment occupational therapy  -MM        Comment eval completed at 942  -MM           General Information    Patient Profile Reviewed? yes  -MM        Onset of Illness/Injury or Date of Surgery 18  -MM        Referring Physician Dr. Parr  -MM        Patient Observations  alert;cooperative;agree to therapy  -MM        Patient/Family Observations no family present  -MM        General Observations of Patient fowlers, no apparent distress, IV, brace present but not donned  -MM        Prior Level of Function independent:;all household mobility;community mobility;transfer;bed mobility;ADL's;feeding;grooming;dressing;bathing  -MM        Equipment Currently Used at Home none  -MM        Pertinent History of Current Functional Problem s/p PSF L4-S1, possible L L5-S1 hemilamiectomy, facetectomy decompression with TLIF on 8/1, s/p R LLIF L4-S1 on 7/30, due to increased chronic back pain, B buttock, thigh, and leg radiculopathy, B foot numbness and tingling with neurogenic claudication  -MM        Existing Precautions/Restrictions brace worn when out of bed;spinal  -MM        Risks Reviewed patient:;increased discomfort  -MM        Benefits Reviewed patient:;improve function;decrease pain;increase knowledge  -MM        Barriers to Rehab none identified  -MM           Relationship/Environment    Primary Source of Support/Comfort spouse  -MM        Lives With spouse  -MM        Family Caregiver if Needed child(ania), adult   Daughter will stay with pt until 8/3, when spouse returns   -MM           Resource/Environmental Concerns    Current Living Arrangements home/apartment/condo  -MM           Home Main Entrance    Number of Stairs, Main Entrance one  -MM           Stairs Within Home, Primary    Number of Stairs, Within Home, Primary one  -MM           Cognitive Assessment/Interventions    Additional Documentation Cognitive Assessment/Intervention (Group)  -MM           Cognitive Assessment/Intervention- PT/OT    Affect/Mental Status (Cognitive) WNL  -MM        Orientation Status (Cognition) oriented x 4  -MM        Follows Commands (Cognition) WFL  -MM        Cognitive Function (Cognitive) WFL  -MM        Personal Safety Interventions fall prevention program maintained;muscle strengthening  facilitated;nonskid shoes/slippers when out of bed;supervised activity  -MM           Bed Mobility Assessment/Treatment    Bed Mobility Assessment/Treatment supine-sit;sit-supine  -MM        Supine-Sit [place] (Bed Mobility) contact guard  -MM        Sit-Supine [place] (Bed Mobility) independent  -MM        Assistive Device (Bed Mobility) head of bed elevated  -MM           Functional Mobility    Functional Mobility- Ind. Level contact guard assist;verbal cues required  -MM        Functional Mobility- Comment down de santiago  -MM           Transfer Assessment/Treatment    Transfer Assessment/Treatment sit-stand transfer;stand-sit transfer;toilet transfer  -MM           Sit-Stand Transfer    Sit-Stand [place] (Transfers) independent  -MM           Stand-Sit Transfer    Stand-Sit [place] (Transfers) independent  -MM           Toilet Transfer    Type (Toilet Transfer) sit-stand;stand-sit  -MM        [place] Level (Toilet Transfer) independent  -MM           ADL Assessment/Intervention    BADL Assessment/Intervention upper body dressing;lower body dressing;toileting  -MM           Upper Body Dressing Assessment/Training    Upper Body Dressing [place] Level don;doff;conditional independence;set up   LSO  -MM        Upper Body Dressing Position edge of bed sitting  -MM           Lower Body Dressing Assessment/Training    Lower Body Dressing [place] Level don;doff;socks;set up;supervision  -MM        Lower Body Dressing Position edge of bed sitting  -MM           Toileting Assessment/Training    [place] Level (Toileting) toileting skills;conditional independence  -MM        Assistive Devices (Toileting) grab bar/safety frame  -MM        Toileting Position unsupported sitting;unsupported standing  -MM           General ROM    GENERAL ROM COMMENTS AROM grossly intact all extremeties  -MM           General Assessment (Manual Muscle Testing)    General Manual Muscle Testing (MMT) Assessment no  strength deficits identified   functionally  -MM           Sensory Assessment/Intervention    Sensory General Assessment no sensation deficits identified  -MM           Positioning and Restraints    Pre-Treatment Position in bed  -MM        Post Treatment Position bed  -MM        In Bed fowlers;call light within reach;encouraged to call for assist;side rails up x2  -MM           Pain Assessment    Additional Documentation Pain Scale: Numbers Pre/Post-Treatment (Group)  -MM           Pain Scale: Numbers Pre/Post-Treatment    Pain Scale: Numbers, Pretreatment 5/10  -MM        Pain Scale: Numbers, Post-Treatment 5/10  -MM        Pain Location back  -MM        Pre/Post Treatment Pain Comment tolerated  -MM        Pain Intervention(s) Repositioned;Ambulation/increased activity  -MM           Orthotics & Prosthetics Management    Orthosis Location spinal orthosis  -MM        Additional Documentation Orthosis Location (Row)  -MM           Spinal Orthosis Management    Type (Spinal Orthosis) LSO (lumbar sacral orthosis)  -MM        Fabrication Comment (Spinal Orthosis) fitted at outside facility  -MM        Functional Design (Spinal Orthosis) static orthosis  -MM        Therapeutic Indications (Spinal Orthosis) post-op positioning/protection;pain management  -MM        Wearing Schedule (Spinal Orthosis) wear when out of bed only  -MM        Orthosis Training (Spinal Orthosis) patient;activity limitations/precautions;donning/doffing orthosis;purpose/goals of orthosis  -MM        Compliance/Wearing Issues (Spinal Orthosis) patient/caregiver comprehend strategies  -MM           Wound 07/30/18 1303 Right flank incision    Wound - Properties Group Date first assessed: 07/30/18  -JR Time first assessed: 1303  -JR Side: Right  -JR Location: flank  -JR Type: incision  -JR       Wound 08/01/18 1035 Other (See comments) back incision    Wound - Properties Group Date first assessed: 08/01/18  -CHRISTELLE Time first assessed: 1035  -CHRISTELLE Side:  Other (See comments)  -CHRISTELLE Location: back  -CHRISTELLE Type: incision  -CHRISTELLE       Coping    Observed Emotional State calm;cooperative  -MM        Verbalized Emotional State acceptance  -MM           Plan of Care Review    Plan of Care Reviewed With patient  -MM           Clinical Impression (OT)    Date of Referral to OT 08/01/18  -MM        OT Diagnosis impaired mobility and adls  -MM        Functional Level at Time of Evaluation (OT Eval) good  -MM        Patient/Family Goals Statement (OT Eval) return home  -MM        Criteria for Skilled Therapeutic Interventions Met (OT Eval) no;no problems identified which require skilled intervention  -MM        Therapy Frequency (OT Eval) evaluation only  -MM        Care Plan Review (OT) evaluation/treatment results reviewed;care plan/treatment goals reviewed;risks/benefits reviewed;patient/other agree to care plan;current/potential barriers reviewed  -MM        Anticipated Discharge Disposition (OT) home with assist  -MM           Living Environment    Home Accessibility stairs to enter home;stairs within home   walk in shower  -MM          User Key  (r) = Recorded By, (t) = Taken By, (c) = Cosigned By    Initials Name Effective Dates    JR Jie Grande, RN 08/02/16 -     CHRISTELLE Wesley Figueroa RN 08/02/16 -     MM Akin Martel, OTR/L 04/03/18 -           Occupational Therapy Education     Title: PT OT SLP Therapies (Done)     Topic: Occupational Therapy (Done)     Point: ADL training (Done)     Description: Instruct learner(s) on proper safety adaptation and remediation techniques during self care or transfers.   Instruct in proper use of assistive devices.   Learning Progress Summary     Learner Status Readiness Method Response Comment Documented by    Patient Done Acceptance E VU OT role, benefits, POC, spinal precautions and bracing  08/02/18 1127          Point: Precautions (Done)     Description: Instruct learner(s) on prescribed precautions during self-care and  functional transfers.   Learning Progress Summary     Learner Status Readiness Method Response Comment Documented by    Patient Done Acceptance E VU OT role, benefits, POC, spinal precautions and bracing  08/02/18 1127                      User Key     Initials Effective Dates Name Provider Type Discipline     04/03/18 -  Akin Martel, OTR/L Occupational Therapist OT                OT Recommendation and Plan  Outcome Summary/Treatment Plan (OT)  Anticipated Discharge Disposition (OT): home with assist  Therapy Frequency (OT Eval): evaluation only  Plan of Care Review  Plan of Care Reviewed With: patient  Plan of Care Reviewed With: patient  Outcome Summary: OT eval completed. pt was CGA to independent for all mobility and adls this date. Pt donned/doffed LSO with set up assist. Pt verbalized and demonstrated understanding of spinal precautions and bracing. Skilled OT not warranted d/t pt independence level. Recommended d/c home with assist.               Outcome Measures     Row Name 08/02/18 1100 08/02/18 0730          How much help from another person do you currently need...    Turning from your back to your side while in flat bed without using bedrails?  -- 4  -MS     Moving from lying on back to sitting on the side of a flat bed without bedrails?  -- 4  -MS     Moving to and from a bed to a chair (including a wheelchair)?  -- 4  -MS     Standing up from a chair using your arms (e.g., wheelchair, bedside chair)?  -- 4  -MS     Climbing 3-5 steps with a railing?  -- 4  -MS     To walk in hospital room?  -- 4  -MS     AM-PAC 6 Clicks Score  -- 24  -MS        How much help from another is currently needed...    Putting on and taking off regular lower body clothing? 4  -MM  --     Bathing (including washing, rinsing, and drying) 4  -MM  --     Toileting (which includes using toilet bed pan or urinal) 4  -MM  --     Putting on and taking off regular upper body clothing 4  -MM  --     Taking care of personal  grooming (such as brushing teeth) 4  -MM  --     Eating meals 4  -MM  --     Score 24  -MM  --        Functional Assessment    Outcome Measure Options AM-PAC 6 Clicks Daily Activity (OT)  -MM AM-PAC 6 Clicks Basic Mobility (PT)  -MS       User Key  (r) = Recorded By, (t) = Taken By, (c) = Cosigned By    Initials Name Provider Type    MS Ely Way R, PT, DPT, NCS Physical Therapist    MM Akin Martel, OTR/L Occupational Therapist          Time Calculation:         Time Calculation- OT     Row Name 08/02/18 1129             Time Calculation- OT    OT Start Time 0942  -MM      OT Stop Time 1011  -MM      OT Time Calculation (min) 29 min  -MM      OT Received On 08/02/18  -MM        User Key  (r) = Recorded By, (t) = Taken By, (c) = Cosigned By    Initials Name Provider Type    Akin Turner, OTR/L Occupational Therapist        Therapy Suggested Charges     Code   Minutes Charges    None           Therapy Charges for Today     Code Description Service Date Service Provider Modifiers Qty    06416305998 HC OT SELFCARE CURRENT 8/2/2018 Akin Martel, OTR/L GO, CH 1    76047321692 HC OT SELFCARE PROJECTED 8/2/2018 Akin Martel, OTR/L GO, CH 1    06409349728 HC OT SELFCARE DISCHARGE 8/2/2018 Akin Martel, OTR/L GO, CH 1    50378251940 HC OT EVAL LOW COMPLEXITY 2 8/2/2018 Akin Martel, OTR/L JOSE E, KX 1          OT G-codes  OT Professional Judgement Used?: Yes  OT Functional Scales Options: AM-PAC 6 Clicks Daily Activity (OT)  Score: 24  Functional Limitation: Self care  Self Care Current Status (): 0 percent impaired, limited or restricted  Self Care Goal Status (): 0 percent impaired, limited or restricted  Self Care Discharge Status (): 0 percent impaired, limited or restricted    OT Discharge Summary  Anticipated Discharge Disposition (OT): home with assist  Reason for Discharge: Independent, At baseline function  Outcomes Achieved: Refer to plan of care for updates on goals  achieved    Akin Martel, OTR/L  8/2/2018

## 2018-08-02 NOTE — PLAN OF CARE
Problem: Patient Care Overview  Goal: Plan of Care Review  Outcome: Ongoing (interventions implemented as appropriate)   08/02/18 1127   Coping/Psychosocial   Plan of Care Reviewed With patient   Plan of Care Review   Progress improving   OTHER   Outcome Summary OT eval completed. pt was CGA to independent for all mobility and adls this date. Pt donned/doffed LSO with set up assist. Pt verbalized and demonstrated understanding of spinal precautions and bracing. Skilled OT not warranted d/t pt independence level. Recommended d/c home with assist.

## 2018-08-02 NOTE — THERAPY DISCHARGE NOTE
Acute Care - Physical Therapy Initial Eval/Discharge   Jemma     Patient Name: Nikki Hutchins  : 1960  MRN: [id number]  Today's Date: 2018   Onset of Illness/Injury or Date of Surgery: 18  Date of Referral to PT: 18  Referring Physician: Dr. Parr      Admit Date: 2018    Visit Dx:  No diagnosis found.  Patient Active Problem List   Diagnosis   • Normal body mass index (BMI)   • Tobacco non-user   • Spondylolisthesis, lumbar region   • Lumbar radiculopathy   • Lumbar stenosis   • Essential hypertension   • Pure hypercholesterolemia   • Drug-induced constipation     Past Medical History:   Diagnosis Date   • Arthritis    • Elevated cholesterol    • Hypertension    • Incontinence of urine      Past Surgical History:   Procedure Laterality Date   • BLADDER SUSPENSION     • BUNIONECTOMY Bilateral    • CATARACT EXTRACTION Bilateral    •  SECTION     • HYSTERECTOMY     • LUMBAR FUSION Right 2018    Procedure: LATERAL LUMBAR INTERBODY FUSION RIGHT L4-5,  L5-S1;  Surgeon: RICARDO Parr MD;  Location: [facility] OR;  Service: Orthopedic Spine   • LUMBAR LAMINECTOMY WITH FUSION N/A 2018    Procedure: POSTERIOR SPINAL FUSION WITH INSTRUMENTATION L4-S1, POSSIBLE LEFT L5-S1 HEMILAMINECTOMY, FACETECTOMY DECOMPRESSION, TRANSFORAMINAL LUMBAR INTERBODY FUSION;  Surgeon: RICARDO Parr MD;  Location: [facility] OR;  Service: Orthopedic Spine          PT ASSESSMENT (last 12 hours)      Physical Therapy Evaluation     Row Name 18 0730          PT Evaluation Time/Intention    Subjective Information complains of;pain  -MS     Document Type discharge evaluation/summary  -MS     Mode of Treatment physical therapy;individual therapy  -MS     Row Name 18 5423          General Information    Patient Profile Reviewed? yes  -MS     Onset of Illness/Injury or Date of Surgery 18  -MS     Referring Physician Dr. Parr  -MS     Patient Observations  alert;cooperative;agree to therapy  -MS     General Observations of Patient pt fowlers in bed, awake and alert in no apparent distress  -MS     Prior Level of Function independent:;all household mobility;community mobility  -MS     Pertinent History of Current Functional Problem s/p PSF L4-S1, possible L L5-S1 hemilamiectomy, facetectomy decompression with TLIF on 8/1, s/p R LLIF L4-S1 on 7/30, due to increased chronic back pain, B buttock, thigh, and leg radiculopathy, B foot numbness and tingling with neurogenic claudication  -MS     Existing Precautions/Restrictions brace worn when out of bed;spinal  -MS     Risks Reviewed patient:;increased discomfort  -MS     Benefits Reviewed patient:;improve function;decrease pain;increase knowledge  -MS     Barriers to Rehab none identified  -MS     Row Name 08/02/18 0730          Relationship/Environment    Primary Source of Support/Comfort spouse   daughter to stay with patient until spouse returns on 8/3  -MS     Lives With spouse   spouse is out of town a lot and will return Friday 8/3  -MS     Row Name 08/02/18 0730          Cognitive Assessment/Intervention- PT/OT    Orientation Status (Cognition) oriented x 4  -MS     Follows Commands (Cognition) WNL  -MS     Row Name 08/02/18 0730          Bed Mobility Assessment/Treatment    Bed Mobility Assessment/Treatment supine-sit-supine  -MS     Supine-Sit-Supine [place] (Bed Mobility) supervision;verbal cues;nonverbal cues (demo/gesture)  -MS     Comment (Bed Mobility) educated on proper rolling technique, pt demonstrates appropriately  -MS     Row Name 08/02/18 0730          Gait/Stairs Assessment/Training    Comment (Gait/Stairs) The patient has been up multiple times and has no difficulty with walking at this time  -MS     Row Name 08/02/18 0730          General ROM    GENERAL ROM COMMENTS all extremities WFL  -MS     Row Name 08/02/18 0730          General Assessment (Manual Muscle Testing)    General Manual Muscle  Testing (MMT) Assessment no strength deficits identified  -MS     Comment, General Manual Muscle Testing (MMT) Assessment R hip flexion sore  -MS     Row Name 08/02/18 0730          Sensory Assessment/Intervention    Sensory General Assessment no sensation deficits identified   numbness/tingling has improved per pt  -MS     Row Name 08/02/18 0730          Pain Assessment    Additional Documentation Pain Scale: FACES Pre/Post-Treatment (Group)  -MS     Row Name 08/02/18 0730          Pain Scale: Numbers Pre/Post-Treatment    Pain Location - Side Right  -MS     Pain Location hip  -MS     Pain Intervention(s) Repositioned  -MS     Row Name 08/02/18 0730          Pain Scale: FACES Pre/Post-Treatment    Pain: FACES Scale, Pretreatment 2-->hurts little bit  -MS     Pain: FACES Scale, Post-Treatment 2-->hurts little bit  -MS     Row Name 08/02/18 0730          Orthotics & Prosthetics Management    Orthosis Location spinal orthosis  -MS     Additional Documentation Orthosis Location (Row)  -MS     Row Name 08/02/18 0730          Spinal Orthosis Management    Type (Spinal Orthosis) LSO (lumbar sacral orthosis)  -MS     Fabrication Comment (Spinal Orthosis) pt fitted at outside faciliity and brace fits properly  -MS     Functional Design (Spinal Orthosis) static orthosis  -MS     Therapeutic Indications (Spinal Orthosis) post-op positioning/protection  -MS     Wearing Schedule (Spinal Orthosis) wear when out of bed only  -MS     Orthosis Training (Spinal Orthosis) patient;donning/doffing orthosis;purpose/goals of orthosis;wearing schedule;able to show back training  -MS     Row Name 08/02/18 0730          Health Promotion    Additional Documentation Plan of Care Review (Group)  -MS     Row Name             Wound 07/30/18 1303 Right flank incision    Wound - Properties Group Date first assessed: 07/30/18  -JR Time first assessed: 1303  -JR Side: Right  -JR Location: flank  -JR Type: incision  -JR    Row Name             Wound  08/01/18 1035 Other (See comments) back incision    Wound - Properties Group Date first assessed: 08/01/18  -CHRISTELLE Time first assessed: 1035  -CHRISTELLE Side: Other (See comments)  -CHRISTELLE Location: back  -CHRISTELLE Type: incision  -CHRISTELLE    Row Name 08/02/18 0730          Plan of Care Review    Plan of Care Reviewed With patient  -MS     Row Name 08/02/18 0730          Physical Therapy Clinical Impression    Date of Referral to PT 08/01/18  -MS     Criteria for Skilled Interventions Met (PT Clinical Impression) no problems identified which require skilled intervention  -MS     Care Plan Review (PT) evaluation/treatment results reviewed  -MS     Row Name 08/02/18 0730          Positioning and Restraints    Post Treatment Position bed  -MS     In Bed sitting EOB;call light within reach;encouraged to call for assist;side rails up x2;with brace  -MS       User Key  (r) = Recorded By, (t) = Taken By, (c) = Cosigned By    Initials Name Provider Type    MS Ely Way R, PT, DPT, NCS Physical Therapist    Jie Nava RN Registered Nurse    Wesley Owen RN Registered Nurse              PT Recommendation and Plan  Anticipated Discharge Disposition (PT): home with assist  Therapy Frequency (PT Clinical Impression): evaluation only  Outcome Summary/Treatment Plan (PT)  Anticipated Discharge Disposition (PT): home with assist  Plan of Care Reviewed With: patient  Progress: improving  Outcome Summary: PT evaluation completed. The patient has been up multiple times with nursing with no difficulty. She was educated on spinal precautions and use of her LSO. She has no further needs for skilled PT at this time.           Outcome Measures     Row Name 08/02/18 0730             How much help from another person do you currently need...    Turning from your back to your side while in flat bed without using bedrails? 4  -MS      Moving from lying on back to sitting on the side of a flat bed without bedrails? 4  -MS      Moving to and from  a bed to a chair (including a wheelchair)? 4  -MS      Standing up from a chair using your arms (e.g., wheelchair, bedside chair)? 4  -MS      Climbing 3-5 steps with a railing? 4  -MS      To walk in hospital room? 4  -MS      AM-PAC 6 Clicks Score 24  -MS         Functional Assessment    Outcome Measure Options AM-PAC 6 Clicks Basic Mobility (PT)  -MS        User Key  (r) = Recorded By, (t) = Taken By, (c) = Cosigned By    Initials Name Provider Type    MS Seamus Ely PADILLA, PT, DPT, NCS Physical Therapist           Time Calculation:         PT Charges     Row Name 08/02/18 0804             Time Calculation    Start Time 0730  -MS      Stop Time 0805  -MS      Time Calculation (min) 35 min  -MS      PT Received On 08/02/18  -MS        User Key  (r) = Recorded By, (t) = Taken By, (c) = Cosigned By    Initials Name Provider Type    MS Seamus Ely PADILLA, PT, DPT, NCS Physical Therapist        Therapy Suggested Charges     Code   Minutes Charges    None           Therapy Charges for Today     Code Description Service Date Service Provider Modifiers Qty    20640408049 HC PT MOBILITY CURRENT 8/2/2018 Ely Way, PT, DPT, NCS GP,  1    43481699608 HC PT MOBILITY PROJECTED 8/2/2018 Ely Way, PT, DPT, NCS GP,  1    58321824564 HC PT MOBILITY DISCHARGE 8/2/2018 Ely Way, PT, DPT, NCS GP, [place] 1    91931270660 HC PT EVAL LOW COMPLEXITY 3 8/2/2018 Ely Way, PT, DPT, NCS GP 1          PT G-Codes  Outcome Measure Options: AM-PAC 6 Clicks Basic Mobility (PT)  Score: 24  Functional Limitation: Mobility: Walking and moving around  Mobility: Walking and Moving Around Current Status (): 0 percent impaired, limited or restricted  Mobility: Walking and Moving Around Goal Status (): 0 percent impaired, limited or restricted  Mobility: Walking and Moving Around Discharge Status (): 0 percent impaired, limited or restricted    PT Discharge Summary  Anticipated Discharge Disposition (PT): home  with assist  Reason for Discharge: Independent  Outcomes Achieved: Refer to plan of care for updates on goals achieved  Discharge Destination: Home with assist    Ely Way, PT, DPT, NCS  8/2/2018

## 2018-08-02 NOTE — PLAN OF CARE
Problem: Patient Care Overview  Goal: Plan of Care Review  Outcome: Ongoing (interventions implemented as appropriate)   08/02/18 0323   Coping/Psychosocial   Plan of Care Reviewed With patient   Plan of Care Review   Progress no change   OTHER   Outcome Summary vss; alert and oriented x 4; c/o back pain; prn pain med offered relief; dressing c/d/i; up x 1 with brace to br; safety mantained     Goal: Individualization and Mutuality  Outcome: Ongoing (interventions implemented as appropriate)    Goal: Discharge Needs Assessment  Outcome: Ongoing (interventions implemented as appropriate)    Goal: Interprofessional Rounds/Family Conf  Outcome: Ongoing (interventions implemented as appropriate)      Problem: Fall Risk (Adult)  Goal: Absence of Fall  Outcome: Ongoing (interventions implemented as appropriate)      Problem: Laminectomy/Foraminotomy/Discectomy (Adult)  Goal: Signs and Symptoms of Listed Potential Problems Will be Absent, Minimized or Managed (Laminectomy/Foraminotomy/Discectomy)  Outcome: Ongoing (interventions implemented as appropriate)      Problem: Skin Injury Risk (Adult)  Goal: Skin Health and Integrity  Outcome: Ongoing (interventions implemented as appropriate)

## 2018-08-02 NOTE — PROGRESS NOTES
[facility]  Daily Progress Note  Nikki Hutchins  MRN: [id number] LOS: 3    Admit Date: 7/30/2018 8/2/2018 7:35 AM    Subjective:          Chief Complaint:  BACK PAIN    Interval History:    Reviewed overnight events and nursing notes.   Status:  STABLE  Pain:  some relief        ROS:  10 point ROS obtained:   Gen: No fevers  HEENT: No migraine or visual change  Lung: No cough, hemoptysis or wheezing  Cv: No chest pain or pressure  Abd: No nausea or vomiting, no change in bowel function, no gi bleeding  Ext: No increased pain or swelling  Neuro: No seizure or syncope  Skin: No new rashes  Endocrine: No polyuria, polyphagia or polydipsia  Psych: No increased anxiety or depression  MS: No increase in joint pain or swelling reported    DIET:  Diet Regular    Medications:       sodium chloride 75 mL/hr Last Rate: 75 mL/hr (08/01/18 0664)       atorvastatin 10 mg Oral Nightly   calcium carb-cholecalciferol 1 tablet Oral Daily   ceFAZolin 1 g Intravenous Q8H   famotidine 20 mg Intravenous Q12H   Or      famotidine 20 mg Oral Q12H   iron sucrose (VENOFER) IVPB 200 mg Intravenous Q24H   losartan 25 mg Oral Daily   polyethylene glycol 17 g Oral BID   vitamin B-12 1,000 mcg Oral Daily       Data:     Code Status:   Code Status and Medical Interventions:   Ordered at: 07/30/18 0554     Level Of Support Discussed With:    Patient     Code Status:    CPR     Medical Interventions (Level of Support Prior to Arrest):    Full       Family History   Problem Relation Age of Onset   • Arthritis Father    • Hypertension Brother      Social History     Social History   • Marital status:      Spouse name: N/A   • Number of children: N/A   • Years of education: N/A     Occupational History   • Not on file.     Social History Main Topics   • Smoking status: Never Smoker   • Smokeless tobacco: Never Used   • Alcohol use No   • Drug use: No   • Sexual activity: Defer     Other Topics Concern   • Not on file     Social  History Narrative   • No narrative on file       Labs:    Lab Results (last 72 hours)     Procedure Component Value Units Date/Time    Vitamin B12 [[id number]]  (Abnormal) Collected:  08/01/18 0603    Specimen:  Blood Updated:  08/01/18 0720     Vitamin B-12 >1,000 (H) pg/mL     Iron Profile [[id number]]  (Abnormal) Collected:  08/01/18 0603    Specimen:  Blood Updated:  08/01/18 0640     Iron 15 (L) mcg/dL      TIBC 315 mcg/dL      Iron Saturation 5 (L) %     Basic Metabolic Panel [[id number]]  (Abnormal) Collected:  07/31/18 0503    Specimen:  Blood Updated:  07/31/18 0529     Glucose 139 (H) mg/dL      BUN 14 mg/dL      Creatinine 0.60 mg/dL      Sodium 138 mmol/L      Potassium 4.8 mmol/L      Chloride 101 mmol/L      CO2 24.0 mmol/L      Calcium 8.9 mg/dL      eGFR   Amer 124 mL/min/1.73      eGFR Non African Amer 102 mL/min/1.73      BUN/Creatinine Ratio 23.3     Anion Gap 13.0 mmol/L     Narrative:       GFR Normal >60  Chronic Kidney Disease <60  Kidney Failure <15    CBC & Differential [[id number]] Collected:  07/31/18 0503    Specimen:  Blood Updated:  07/31/18 0520    Narrative:       The following orders were created for panel order CBC & Differential.  Procedure                               Abnormality         Status                     ---------                               -----------         ------                     CBC Auto Differential[[id number]]        Abnormal            Final result                 Please view results for these tests on the individual orders.    CBC Auto Differential [[id number]]  (Abnormal) Collected:  07/31/18 0503    Specimen:  Blood Updated:  07/31/18 0520     WBC 10.26 10*3/mm3      RBC 3.77 (L) 10*6/mm3      Hemoglobin 10.9 (L) g/dL      Hematocrit 33.5 (L) %      MCV 88.9 fL      MCH 28.9 pg      MCHC 32.5 (L) g/dL      RDW 12.6 %      RDW-SD 41.1 fl      MPV 8.4 fL      Platelets 247 10*3/mm3      Neutrophil % 85.3 (H) %      Lymphocyte % 6.7 (L) %      Monocyte %  "7.6 %      Eosinophil % 0.0 %      Basophil % 0.0 %      Immature Grans % 0.4 %      Neutrophils, Absolute 8.75 (H) 10*3/mm3      Lymphocytes, Absolute 0.69 (L) 10*3/mm3      Monocytes, Absolute 0.78 10*3/mm3      Eosinophils, Absolute 0.00 10*3/mm3      Basophils, Absolute 0.00 10*3/mm3      Immature Grans, Absolute 0.04 (H) 10*3/mm3      nRBC 0.0 /100 WBC             Objective:     Vitals: BP 94/50 (BP Location: Left arm, Patient Position: Lying)   Pulse 76   Temp 98.5 °F (36.9 °C) (Oral)   Resp 16   Ht 152 cm (59.84\")   Wt 53.2 kg (117 lb 4.6 oz)   SpO2 98%   BMI 23.03 kg/m²      Intake/Output Summary (Last 24 hours) at 18 0735  Last data filed at 18 1400   Gross per 24 hour   Intake             1200 ml   Output             1625 ml   Net             -425 ml    Temp (24hrs), Av.6 °F (37 °C), Min:98.3 °F (36.8 °C), Max:99 °F (37.2 °C)        Physical Examination:   General appearance - alert, well appearing, and in no distress and oriented to person, place, and time  Mental status - alert, oriented to person, place, and time, normal mood, behavior, speech, dress, motor activity, and thought processes  Eyes - pupils equal and reactive, extraocular eye movements intact  Ears - bilateral TM's and external ear canals normal, hearing grossly normal bilaterally  Mouth - mucous membranes moist, pharynx normal without lesions  Neck - supple, no significant adenopathy  Lymphatics - no palpable lymphadenopathy, no hepatosplenomegaly  Chest - clear to auscultation, no wheezes, rales or rhonchi, symmetric air entry, no tachypnea, retractions or cyanosis  Heart - normal rate, regular rhythm, normal S1, S2, no murmurs, rubs, clicks or gallops  Abdomen - soft, nontender, nondistended, no masses or organomegaly bowel sounds normal  Neurological - alert, oriented, normal speech, no focal findings or movement disorder noted  Musculoskeletal - no joint tenderness, deformity or swelling  Extremities - peripheral " pulses normal, no pedal edema, no clubbing or cyanosis  Skin - normal coloration and turgor, no rashes, no suspicious skin lesions noted      Assessment and Plan:     Primary Problem:  <principal problem not specified>    Hospital Problem list:  Active Problems:    Lumbar stenosis    Essential hypertension    Pure hypercholesterolemia    Drug-induced constipation      PMH:  Past Medical History:   Diagnosis Date   • Arthritis    • Elevated cholesterol    • Hypertension    • Incontinence of urine        Treatment Plan:  Active Problems:    Lumbar stenosis    Essential hypertension    Pure hypercholesterolemia    Drug-induced constipation        Anemia post-op expected-check iron, B12,and folate. Replenish as needed.Transfuse at acceptable levels depending on clinical judgement and comorbidities. Recheck in AM     Constipation-start with Miralax 1 capful BID until BM, then decrease to 1 x a day,then can step up to Amitiza 24 mcg po BID which can be used for opioid induced constipation,and ultimately end up with Relistor 12 mcg subq every 48 hours to block the effect of narcotics on the gut.        Hypertension-review pre and post BP's,will restart home BP meds when BP allows. Add clonidine 0.1 mg every 4 hours prn if bp> 140/90,monitor BP and adjust meds as necessary.        GERD-exacerbated by pain meds and anesthesia, will add PPI as needed and can step up to Carafate 1 gm po q AC and nightly.      Discharge planning:   Home    Reviewed treatment plans with the patient and/or family.   20 minutes spent in face to face interaction and coordination of care.     Electronically signed by Ezra Telles MD on 8/2/2018 at 7:35 AM

## 2018-08-02 NOTE — PLAN OF CARE
Problem: Patient Care Overview  Goal: Plan of Care Review  Outcome: Ongoing (interventions implemented as appropriate)   08/02/18 0802   Coping/Psychosocial   Plan of Care Reviewed With patient   Plan of Care Review   Progress improving   OTHER   Outcome Summary PT evaluation completed. The patient has been up multiple times with nursing with no difficulty. She was educated on spinal precautions and use of her LSO. She has no further needs for skilled PT at this time.

## 2018-08-02 NOTE — DISCHARGE SUMMARY
Date of Discharge:  8/2/2018    Admission Diagnosis: M54.16    Discharge Diagnosis:   1.  Increasing chronic back pain  2.  Bilateral buttock, thigh, and leg radiculopathy  3.  Bilateral foot numbness, tingling  4.  Neurogenic claudication  5.  Severe degenerative disc disease L4 to S1  6.  Degenerative lumbar scoliosis L4 to S1  7.  Spondylolisthesis with instability L4-5  8.  Retrolisthesis with instability L5-S1  9.  Severe facet arthropathy L4 to S1  10.  Central and bilateral foraminal stenosis L4 to S1  11.  Transitional lumbosacral anatomy  12.  Status post right LLIF with instrumentation L4 to S1, 7/30/2018  13. Status post Posterior spinal fusion L4-5, L5-S1 - 8/1/18    Consults During Admission: dr. dolan    Hospital Course  Patient is a 57 y.o. female Known to our practice. Admitted for the above lumbar fusion.  This has been well tolerated and the patient will be discharged home today in good stable condition with instructions for brace when out of bed.  No driving until directed.  Patient will follow-up with Dr. Parr's clinic in two weeks. They will call if problems arise.       Condition on Discharge:  STABLE    Vital Signs  Temp:  [98.3 °F (36.8 °C)-99 °F (37.2 °C)] 98.5 °F (36.9 °C)  Heart Rate:  [] 76  Resp:  [14-20] 16  BP: ()/(50-76) 94/50    Physical Exam:   Alert and oriented ×3, no acute distress, grossly neurovascularly intact, vital signs stable, dressing clean dry and intact, moving all extremities without focal deficit. Mild right HF weakness improved to near baseline.    Discharge Disposition  Home or Self Care    Discharge Medications     Discharge Medications      Continue These Medications      Instructions Start Date   aspirin 81 MG EC tablet   81 mg, Oral, Daily      CALTRATE 600+D PO   Oral, Daily      losartan 25 MG tablet  Commonly known as:  COZAAR   25 mg, Oral, Daily      OSTEO BI-FLEX TRIPLE STRENGTH PO   Oral, Daily      simvastatin 10 MG tablet  Commonly  known as:  ZOCOR   10 mg, Oral, Nightly      vitamin B-12 1000 MCG tablet  Commonly known as:  CYANOCOBALAMIN   1,000 mcg, Oral, Daily         Stop These Medications    naproxen sodium 220 MG tablet  Commonly known as:  ALEVE     sulfamethoxazole-trimethoprim 800-160 MG per tablet  Commonly known as:  BACTRIM DS,SEPTRA DS            Discharge Diet: Resume Home diet, advance as tolerated    Activity at Discharge: Resume home activity advace as tolerated, no lifting, no twisting, no bending, brace as directed, no driving until directed.     Follow-up Appointments  Followup with PCP within one week  Followup [facility] at 2weeks post-op         MARTY Singh  08/02/18  7:30 AM

## 2018-08-03 NOTE — PAYOR COMM NOTE
"[id number]  [facility] 8-2-18  UR  [phone]    Nikki Hodgson (57 y.o. Female)     Date of Birth Social Security Number Address Home Phone MRN    1960  [address] [place] [address] [phone] [id number]    Anabaptism Marital Status          Scientology        Admission Date Admission Type Admitting Provider Attending Provider Department, Room/Bed    7/30/18 Elective RICARDO Parr MD  [facility] 3A, 351/1    Discharge Date Discharge Disposition Discharge Destination        8/2/2018 Home or Self Care              Attending Provider:  (none)   Allergies:  Niacin And Related    Isolation:  None   Infection:  None   Code Status:  Prior    Ht:  152 cm (59.84\")   Wt:  53.2 kg (117 lb 4.6 oz)    Admission Cmt:  None   Principal Problem:  None                Active Insurance as of 7/30/2018     Primary Coverage     Payor Plan Insurance Group Employer/Plan Group    [facility] [id number]     Payor Plan Address Payor Plan Phone Number Effective From Effective To    [address]  1/1/2014     [facility] [id number]       Subscriber Name Subscriber Birth Date Member ID       ANETA HODGSON [birth year]/18/1959 [id number]                 Emergency Contacts      (Rel.) Home Phone Work Phone Mobile Phone    Aneta Hodgson (Spouse) [phone] -- [phone]    Padmini Cervantes (Daughter) [phone] -- --               Operative/Procedure Notes (all)      RICARDO Parr MD at 7/30/2018  6:39 AM  Version 1 of 1       Lateral lumbar interbody fusion with instrumentation Procedure Note    Nikki Hodsgon  7/30/2018    Pre-op Diagnosis:     1.  Increasing chronic back pain  2.  Bilateral buttock, thigh, and leg radiculopathy  3.  Bilateral foot numbness, tingling  4.  Neurogenic claudication  5.  Severe degenerative disc disease L4 to S1  6.  Degenerative lumbar scoliosis L4 to S1  7.  Spondylolisthesis with instability L4-5  8.  Retrolisthesis with " instability L5-S1  9.  Severe facet arthropathy L4 to S1  10.  Central and bilateral foraminal stenosis L4 to S1  11.  Transitional lumbosacral anatomy    Post-op Diagnosis:    same    Procedure/CPT® Codes:    1.  Right Lateral Lumbar Interbody Fusion L4-5, L5-S1  2.  Anterior spinal instrumentation L4-5, L5-S1 (Lanx lateral plate and screws x 2)  3.  Use of PEEK interbody biomechanical device for fusion L4-5, L5-S1 (Lanx PEEK spacer x 2)  4.  Use of bone marrow aspirate obtained through separate incision for fusion  5.  Use of allograft bone chips for fusion  6.  Use of fluoroscopy for confirmation of surgical level, placement of PEEK spacers and instrumentation  7.  Intraoperative neural monitoring    Anesthesia: General    Surgeon: ELVA Parr MD    Assistant: Bhargav Wilson PA-C    Estimated Blood Loss: Minimal    Complications: None    Condition: Stable to PACU.    Indications:    The patient is a 57-year-old who sees Dr. Robi Tom for medical issues.  She presented to the office with complaints of increasing chronic back pain, bilateral buttock, thigh, and leg radiculopathy as well as bilateral foot numbness and tingling.  Her symptoms were very consistent with neurogenic claudication.  Imaging studies revealed severe degenerative disc disease at both L4-5 and L5-S1 along with a degenerative scoliosis.  There was spondylolisthesis with instability L4-5 and there was retrolisthesis at L5-S1.  Both levels had severe facet arthropathy also contributing to central and bilateral foraminal stenosis.  The patient was noted to have transitional anatomy with possibly four mobile lumbar vertebral bodies, although for counting purposes based on the MRI report the lowest mobile segment was labeled L5-S1.     After failing conservative measures, it was mutually decided that surgery would be the best option. Risks, benefits, and complications of surgery were discussed with the patient. The patient appeared well  informed and wished to proceed. We specifically discussed the risk of infection, blood loss, nerve root injury, CSF leak, and the possibility of incomplete resolution of symptoms. We also discussed the possible risk of a nonunion and the potential need for additional surgery in the event of a pseudoarthrosis or hardware failure.     We elected proceed with a staged operation.  It is planned that we will be returning to surgery for a second posterior procedure at a later date.    Operative Procedure:    After obtaining informed consent and verifying the correct operative site, the patient was brought to the operating room and placed supine on operating table.  A general anesthetic was provided by the anesthesia service with the assistance of an endotracheal tube.  Once this was appropriately positioned and secured, the patient was carefully rotated into the lateral decubitus position with the right side up.  All bony prominences were well-padded.  3 inch wide cloth tape was used to maintain the patient's position.  It was also used to tip open the pelvis slightly allowing better access to the lumbar spine through a lateral approach.  Fluoroscopy was then used to identify the L4-5 and L5-S1 levels, and the skin was marked for our planned incision.  The patient was noted to have transitional anatomy at the lumbosacral junction making L5-S1 more similar to a usual L4-5 level in terms of accessibility from a lateral approach.  The right flank was then prepped and draped in the usual sterile fashion.  A surgical timeout was taken to confirm this was the correct patient, we were working at the correct level, and that preoperative antibiotics were given in a timely fashion.    A small stab incision was created over the right anterior iliac crest using a 15 blade scalpel.  Through this stab incision, a Jamshidi needle was advanced into the iliac crest.  Through the needle we aspirated approximately 50-60 mL of bone marrow  from the iliac crest.  This bone marrow aspirate was then passed off in a sterile fashion for processing and concentration to be later mixed with allograft bone chips to assist with obtaining a fusion.    An oblique incision was created of the right flank using a 10 blade scalpel directly centered between the L4-5 and L5-S1 segments. Dissection was carried bluntly through subcutaneous tissues. Blunt dissection was also carried between the external oblique and internal oblique musculature. The transversalis fascia was pierced allowing access to the retro-peroneal space. At this point, a series of neuro monitoring probes were used to safely access the L5-S1 level. We used stimulated and free run EMG for this purpose. Once nerve roots were confirmed to be out of harm's way, self retaining retractors were placed for continuous exposure.  Again the patient had transitional lumbosacral anatomy making the L5-S1 level more similar in appearance to a usual L4-5 level.     An annulotomy was then created at the L5-S1 area using a 15 blade scalpel on a long handle. A Serna elevator was used to remove disc material off of the endplates. Disc material was removed using Kerrisons, pituitaries, and forward angled curettes. Once all disc material had been retrieved, I used the Serna elevator to divide the contralateral annulus. This assisted with mobilization of the vertebral body. We then used a series of endplate scrapers to prepare the endplates for interbody fusion. Trial spacers were malleted into position and for the disc space it was felt that a 12 mm x 45 mm implant would be the best fit to restore disc height. The disc space was then thoroughly irrigated with saline solution.     A PEEK spacer from the Lanx instrumentation set measuring 12 mm x 45 mm x 22 mm was then packed as tightly as possible with a combination of the previously obtained bone marrow aspirate and allograft bone chips. This PEEK spacer was then malleted  into the L4-5 disc space under fluoroscopic guidance. It was placed as a PEEK interbody biomechanical device to assist with interbody fusion.  On the lateral fluoroscopy images, the spacer was noted to be somewhat anterior consistent with a rupture of the anterior longitudinal ligament.  The spacer entered the disc space rather easily also consistent with this.  At this point I decided to remove the PEEK spacer and reposition it this time using screw fixation as well.  A new 12 mm x 45 mm x 22 mm spacer was then packed with the bone graft material and malleted into the L5-S1 disc space.  I then chose a 1 hole plate to ensure that the spacer would not migrate anteriorly in the postoperative period before we have a chance to place posterior instrumentation.  I placed a single 35 mm screw into L5 for this purpose.  A cover plate was then used to prevent screw backout.    After confirming the spacer and instrumentation was properly positioned in both the AP and lateral projections, next attention was turned to the L4-5 segment.    The neuro monitoring probes were once again used this time to access L4-5.  Once nerve roots were confirmed to be out of harm's way, self retaining retractors were placed for continuous exposure of L4-5.  This disc space was prepared in an identical fashion as L5-S1.  We used first the 15 blade scalpel to create an annulotomy.  A Serna elevators were used to remove disc material off of the endplates.  Disc material was removed using Kerrisons, pituitaries, and forward angled curettes.  Endplate scrapers were used to prepare the endplates for interbody fusion and the contralateral annulus was divided with the Serna elevator.  Trial spacers were then malleted into position across L4-5.  A second PEEK spacer was then chosen matching the final trial.  In this case we used a 12 mm x 50 mm implant.  This spacer was packed as tightly as possible with a combination of allograft bone and bone marrow  aspirate.  This spacer was malleted into the L4-5 disc space under fluoroscopic guidance as a PEEK interbody biomechanical device to assist with interbody fusion.    Once this spacer was confirmed to be properly positioned, I chose a 2-hole plate to help augment the fusion of L4-5. This plate was held into position using screws. I placed a 35 mm screw into both L4 and L5.  A cover plate was then screwed into position to prevent screw backout once both screws were properly positioned.     The wound was then irrigated thoroughly. A thorough inspection was then undertaken to ensure that we had adequate hemostasis. Bleeding at this point was controlled using thrombin with Gelfoam powder and bipolar cautery. Closure was then accomplished with a #1 Vicryl reapproximating the transversalis fascia as well as the internal and external oblique musculature. Immediate subcutaneous tissues were closed with a 3-0 Vicryl and skin closure was accomplished using Mastisol and Steri-Strips. The wound was then sterilely dressed. The patient was then carefully rotated supine onto a hospital gurney, extubated, and sent to the recovery room in good stable condition.     The patient tolerated the procedure well. There were no complications. We estimated blood loss to be minimal. The patient remained hemodynamically stable.     Bhargav Wilson PA-C provided critical assistance during the procedure. His assistance was medically necessary in order to allow the procedure to occur in the most safe and efficient manner.    ELVA Parr MD     Date: 7/30/2018  Time: 1:09 PM        Electronically signed by RICARDO Parr MD at 7/30/2018  1:11 PM     RICARDO Parr MD at 8/1/2018  6:40 AM  Version 1 of 1       Posterior lumbar fusion with instrumentation Procedure Note    Nikki Hutchins  8/1/2018    Pre-op Diagnosis:     1.  Increasing chronic back pain  2.  Bilateral buttock, thigh, and leg radiculopathy  3.  Bilateral foot  numbness, tingling  4.  Neurogenic claudication  5.  Severe degenerative disc disease L4 to S1  6.  Degenerative lumbar scoliosis L4 to S1  7.  Spondylolisthesis with instability L4-5  8.  Retrolisthesis with instability L5-S1  9.  Severe facet arthropathy L4 to S1  10.  Central and bilateral foraminal stenosis L4 to S1  11.  Transitional lumbosacral anatomy  12.  Status post right LLIF with instrumentation L4 to S1, 7/30/2018    Post-op Diagnosis:     same     Procedure/CPT® Codes:     1.  Posterior spinal fusion L4-5, L5-S1  2.  Posterior spinal instrumentation L4-S1 (Alph[employer] pedicle screws and rods)  3.  Use of locally obtained autograft bone for fusion  4.  Use of fluoroscopy for confirmation of surgical level and placement of instrumentation  5.  Intraoperative neural monitoring with pedicle screw stimulation     Anesthesia: General     Surgeon: ELVA Parr MD     Assistant:  Bhargav Wilson PA-C     Estimated Blood Loss: minimal     Complications: None     Condition: Stable to PACU.     Indications:     The patient is a 57-year-old who sees Dr. Robi Tom for medical issues.  She presented to the office with complaints of increasing chronic back pain, bilateral buttock, thigh, and leg radiculopathy as well as bilateral foot numbness and tingling.  Her symptoms were very consistent with neurogenic claudication.  Imaging studies revealed severe degenerative disc disease at both L4-5 and L5-S1 along with a degenerative scoliosis.  There was spondylolisthesis with instability L4-5 and there was retrolisthesis at L5-S1.  Both levels had severe facet arthropathy also contributing to central and bilateral foraminal stenosis.  The patient was noted to have transitional anatomy with possibly four mobile lumbar vertebral bodies, although for counting purposes based on the MRI report the lowest mobile segment was labeled L5-S1.      After failing all conservative measures, it was mutually decided that surgery  would be the best option. Risks, benefits, and complications of surgery were discussed in detail. The patient appeared well informed and wished to proceed. We specifically discussed the risk of infection, blood loss, nerve root injury, CSF leak, and the possibility of incomplete resolution of symptoms. We also discussed the possible risk of a nonunion and the potential need for additional surgery in the event of a pseudoarthrosis or hardware failure.      We elected proceed with a staged operation.  Previously on 7/30/2018, the patient underwent a right lateral fusion of L4-5 and L5-S1 with instrumentation.  Again the patient had transitional anatomy making it possible for us to reach her lowest spinal segment through a lateral approach.  Today we return to surgery for the second posterior stage the procedure involving a posterior fusion with instrumentation.     Operative Procedure:     After obtaining informed consent and verifying the correct operative site, the patient was brought to the operating room. A general anesthetic was provided by the anesthesia service with the assistance of an endotracheal tube. Once this was appropriately positioned and secured, the patient was carefully rotated prone onto a Landry frame. All bony processes were well-padded. The lumbar region was prepped and draped in usual sterile fashion. A surgical timeout was taken to confirm this was the correct patient, we were working at the correct levels, and that preoperative antibiotics were given in a timely fashion.      Using fluoroscopy for guidance, a left sided Wiltsey incision was created overlying L4 to S1 using a 10 blade scalpel.  Dissection was carried through subcutaneous tissues using Bovie cautery.  The lumbar fascia was divided in line with the incision and blunt dissection was carried between the multifidus and the longissimus down to the facet joints of L4-5 and L5-S1.  Dissection was carried laterally exposing the  transverse processes of L4, L5, and the sacral ala.  We then exposed the facet joints further.  The capsules were destroyed using Bovie cautery exposing as much bone as possible.  The high-speed bur was then used to decorticate the facet joints, destroying as much of the facet cartilage as possible.  I also decorticated the lateral pars region and the tranverse processes.  The locally obtained autograft bone was left in situ in the posterolateral gutter.  This constituted a posterior fusion of L4-5 and L5-S1.      Next under fluoroscopic guidance, Jamshidi needles were used to cannulate the pedicles of L4, L5, and S1.  Once the needles were properly positioned in the pedicles, guidewires were placed to maintain position.  Over each of the guidewires, an Alphatec pedicle screw was placed.  We used 5.5 mm x 45 mm screws into each of the pedicles.  I then used a neuro monitoring probe to stimulate the screws themselves confirming appropriate position ensuring no breach of the pedicles.  After confirming the screws were properly positioned, an appropriate-sized tian was chosen to span the pedicle screws.  The tian was tightened into position using set screws.  The setscrews were tightened using the appropriate antitorque wrench and torque limiting screwdriver provided by [employer].     Next again under fluoroscopic guidance, I created stab incisions over the pedicles on the right side at L4 and S1.  Through these incisions I used the Jamshidi needles to cannulate the pedicles.  Once properly positioned, I placed guidewires to maintain position.  I then placed a 5.5 mm x 45 mm screws into each of these pedicles.  I then used the neuro monitoring probe to stability to screws again ensuring that they were properly positioned.  A second tian was chosen and passed under the musculature.  This was held into position using set screws tightened in the same fashion using the antitorque wrench and torque loading screwdriver again  provided by [facility].      The wounds were then thoroughly irrigated and an inspection was then undertaken to ensure that we had adequate hemostasis.  Bleeding at this point was controlled using thrombin with Gelfoam powder and bipolar cautery.  Final fluoroscopy imaging confirmed adequate position of the newly placed posterior instrumentation spanning L4 to S1.      Wound closure was then accomplished by reapproximating the fascia with a #1 Vicryl area immediate subcutaneous tissues were closed using a 2-0 Vicryl and skin closure was augmented using Mastisol and Steri-Strips.  The incisions were then washed and sterilely dressed with Bioclusive dressings.      The patient was then carefully rotated supine onto a hospital gurney, extubated, and sent to the recovery room in good stable condition.  The patient tolerated the procedure well.  There were no complications.  We estimated blood loss to be minimal.      Bhargav Wilson PA-C provided critical assistance during the procedure.  His assistance was medically necessary in order to allow the procedure to occur in the most safe and efficient manner.    ELVA Parr MD     Date: 8/1/2018  Time: 10:44 AM        Electronically signed by RICARDO Parr MD at 8/1/2018 10:44 AM          Discharge Summary      Kal Velazquez PA at 8/2/2018  7:30 AM            Date of Discharge:  8/2/2018    Admission Diagnosis: M54.16    Discharge Diagnosis:   1.  Increasing chronic back pain  2.  Bilateral buttock, thigh, and leg radiculopathy  3.  Bilateral foot numbness, tingling  4.  Neurogenic claudication  5.  Severe degenerative disc disease L4 to S1  6.  Degenerative lumbar scoliosis L4 to S1  7.  Spondylolisthesis with instability L4-5  8.  Retrolisthesis with instability L5-S1  9.  Severe facet arthropathy L4 to S1  10.  Central and bilateral foraminal stenosis L4 to S1  11.  Transitional lumbosacral anatomy  12.  Status post right LLIF with instrumentation L4 to  S1, 7/30/2018  13. Status post Posterior spinal fusion L4-5, L5-S1 - 8/1/18    Consults During Admission: dr. dolan    Hospital Course  Patient is a 57 y.o. female Known to our practice. Admitted for the above lumbar fusion.  This has been well tolerated and the patient will be discharged home today in good stable condition with instructions for brace when out of bed.  No driving until directed.  Patient will follow-up with Dr. Parr's clinic in two weeks. They will call if problems arise.       Condition on Discharge:  STABLE    Vital Signs  Temp:  [98.3 °F (36.8 °C)-99 °F (37.2 °C)] 98.5 °F (36.9 °C)  Heart Rate:  [] 76  Resp:  [14-20] 16  BP: ()/(50-76) 94/50    Physical Exam:   Alert and oriented ×3, no acute distress, grossly neurovascularly intact, vital signs stable, dressing clean dry and intact, moving all extremities without focal deficit. Mild right HF weakness improved to near baseline.    Discharge Disposition  Home or Self Care    Discharge Medications     Discharge Medications      Continue These Medications      Instructions Start Date   aspirin 81 MG EC tablet   81 mg, Oral, Daily      CALTRATE 600+D PO   Oral, Daily      losartan 25 MG tablet  Commonly known as:  COZAAR   25 mg, Oral, Daily      OSTEO BI-FLEX TRIPLE STRENGTH PO   Oral, Daily      simvastatin 10 MG tablet  Commonly known as:  ZOCOR   10 mg, Oral, Nightly      vitamin B-12 1000 MCG tablet  Commonly known as:  CYANOCOBALAMIN   1,000 mcg, Oral, Daily         Stop These Medications    naproxen sodium 220 MG tablet  Commonly known as:  ALEVE     sulfamethoxazole-trimethoprim 800-160 MG per tablet  Commonly known as:  BACTRIM DS,SEPTRA DS            Discharge Diet: Resume Home diet, advance as tolerated    Activity at Discharge: Resume home activity advace as tolerated, no lifting, no twisting, no bending, brace as directed, no driving until directed.     Follow-up Appointments  Followup with PCP within one week  Followup  [facility] at 2weeks post-op         MARTY Singh  08/02/18  7:30 AM              Electronically signed by Kal Velazquez PA at 8/2/2018  7:31 AM

## 2020-07-01 ENCOUNTER — OFFICE VISIT (OUTPATIENT)
Dept: OBSTETRICS AND GYNECOLOGY | Facility: CLINIC | Age: 60
End: 2020-07-01

## 2020-07-01 VITALS
HEIGHT: 60 IN | WEIGHT: 125 LBS | DIASTOLIC BLOOD PRESSURE: 70 MMHG | BODY MASS INDEX: 24.54 KG/M2 | SYSTOLIC BLOOD PRESSURE: 98 MMHG

## 2020-07-01 DIAGNOSIS — Z78.9 NONSMOKER: ICD-10-CM

## 2020-07-01 DIAGNOSIS — R35.0 URINARY FREQUENCY: ICD-10-CM

## 2020-07-01 DIAGNOSIS — Z90.710 S/P HYSTERECTOMY: ICD-10-CM

## 2020-07-01 DIAGNOSIS — Z09 S/P GYNECOLOGICAL SURGERY, FOLLOW-UP EXAM: ICD-10-CM

## 2020-07-01 DIAGNOSIS — R30.0 DYSURIA: Primary | ICD-10-CM

## 2020-07-01 PROCEDURE — 99204 OFFICE O/P NEW MOD 45 MIN: CPT | Performed by: OBSTETRICS & GYNECOLOGY

## 2020-07-01 RX ORDER — AMLODIPINE BESYLATE 5 MG/1
5 TABLET ORAL DAILY
COMMUNITY

## 2020-07-01 NOTE — PROGRESS NOTES
[facility]  Nikki Hutchins  : 1960  MRN: [id number]  [facility]: [id number]    Consultation about urinary frequency    Subjective   Nikki Hutchins is a 59 y.o. year old  who presents for consultation about surgery due to urinary frequency.  The patient reports crippling frequency; she thinks that it was present before the sling was placed, but reports that it has gotten a lot worse since the surgery.  The patient had to void three times just while she was getting roomed and waiting to see me for this visit.  Patient is under the impression that frequency is the reason that the sling was placed. She reports that there is always gross hematuria and that she frequently has a bladder infection.  She reports sometimes having hesitancy or sensation of incomplete emptying.  She is usually up 10-12 times during the night when she is trying to sleep.    The patient drinks coffee during the day and then water the remainder of the day.      Past Medical History:   Diagnosis Date   • Arthritis    • Elevated cholesterol    • Hypertension    • Incontinence of urine      Past Surgical History:   Procedure Laterality Date   • BLADDER SUSPENSION     • BUNIONECTOMY Bilateral    • CATARACT EXTRACTION Bilateral    •  SECTION     • HYSTERECTOMY      pt reports still having 1 ovary but unsure which one   • LUMBAR FUSION Right 2018    Procedure: LATERAL LUMBAR INTERBODY FUSION RIGHT L4-5,  L5-S1;  Surgeon: RICARDO Parr MD;  Location:  [facility] OR;  Service: Orthopedic Spine   • LUMBAR LAMINECTOMY WITH FUSION N/A 2018    Procedure: POSTERIOR SPINAL FUSION WITH INSTRUMENTATION L4-S1, POSSIBLE LEFT L5-S1 HEMILAMINECTOMY, FACETECTOMY DECOMPRESSION, TRANSFORAMINAL LUMBAR INTERBODY FUSION;  Surgeon: RICARDO Parr MD;  Location:  [facility] OR;  Service: Orthopedic Spine     Social History    Tobacco Use      Smoking status: Never Smoker      Smokeless tobacco: Never Used      Current Outpatient  "Medications:   •  amLODIPine (NORVASC) 5 MG tablet, Take 5 mg by mouth Daily., Disp: , Rfl:   •  aspirin 81 MG EC tablet, Take 81 mg by mouth Daily., Disp: , Rfl:   •  Calcium Carbonate-Vitamin D (CALTRATE 600+D PO), Take  by mouth Daily., Disp: , Rfl:   •  EQL GLUCOSAMINE CHONDROITIN PO, Take  by mouth. 1500 with 1200, Disp: , Rfl:   •  Misc Natural Products (OSTEO BI-FLEX TRIPLE STRENGTH PO), Take  by mouth Daily., Disp: , Rfl:   •  simvastatin (ZOCOR) 10 MG tablet, Take 10 mg by mouth Every Night., Disp: , Rfl:   •  vitamin B-12 (CYANOCOBALAMIN) 1000 MCG tablet, Take 1,000 mcg by mouth Daily., Disp: , Rfl:     Allergies   Allergen Reactions   • Niacin And Related Rash       Family History   Problem Relation Age of Onset   • Arthritis Father    • Hypertension Brother      Review of Systems   Constitutional: Negative for activity change and unexpected weight change.   Respiratory: Negative for shortness of breath.    Cardiovascular: Negative for chest pain.   Gastrointestinal: Negative for abdominal pain, constipation and diarrhea.   Genitourinary: Positive for enuresis (mild, but difficult to determine what type of leakage by taking history), frequency, hematuria and urgency. Negative for pelvic pain.         Objective   BP 98/70   Ht 152.4 cm (60\")   Wt 56.7 kg (125 lb)   BMI 24.41 kg/m²     Physical Exam   Physical Exam   Constitutional: She is oriented to person, place, and time. She appears well-developed and well-nourished. No distress.   HENT:   Head: Normocephalic and atraumatic.   Eyes: EOM are normal.   Neck: Normal range of motion. No thyromegaly present.   Pulmonary/Chest: Effort normal.   Abdominal: Soft. She exhibits no distension. There is no tenderness.   Genitourinary:   Genitourinary Comments: S/p hysterectomy.  Vaginal cuff unremarkable.  Grade I cystocele, grade I rectocele.  Cuff well-supported.  No hypermobility to urethra.  No exposure of previously-placed mesh sling.   Musculoskeletal: " Normal range of motion.   Neurological: She is alert and oriented to person, place, and time.   Skin: Skin is warm and dry.   Psychiatric: She has a normal mood and affect. Her behavior is normal. Judgment normal.   Nursing note and vitals reviewed.      Labs  Lab Results   Component Value Date     08/02/2018    HGB 9.6 (L) 08/02/2018    HCT 29.7 (L) 08/02/2018    WBC 9.79 08/02/2018     08/02/2018    K 3.5 08/02/2018     08/02/2018    CO2 29.0 08/02/2018    BUN 10 08/02/2018    CREATININE 0.58 08/02/2018    GLUCOSE 113 (H) 08/02/2018    ALBUMIN 4.70 07/24/2018    CALCIUM 9.1 08/02/2018    AST 34 07/24/2018    ALT 27 07/24/2018    BILITOT 0.3 07/24/2018        Assessment & Plan    Nikki was seen today for vaginal prolapse.    Diagnoses and all orders for this visit:    Dysuria: checking for UTI.  Unclear how long gross hematuria has been present, but frequency has been present since prior to mid-urethral sling in 2015.  Hesitance and frequency appear to have gotten worse at that time.  -     Urinalysis With Microscopic If Indicated (No Culture) - Urine, Clean Catch  -     Urine Culture - Urine, Urine, Clean Catch    Urinary frequency: Patient reports this is the reason she had the midi-urethral sling placed, but that would obviously not be an appropriate indication.  She reports that frequency has increased since her sling was placed; consistent with obstruction/hesitancy/frequency as known complications of surgery.  Patient given two weeks of Myrbetriq samples and will RTO in 3-4 weeks to discuss.  I was also hoping to schedule her for BHN testing before I see her next time, but they are catching up from quarantine and have no openings on their schedule until August.   -     Urinalysis With Microscopic If Indicated (No Culture) - Urine, Clean Catch  -     Urine Culture - Urine, Urine, Clean Catch    BMI 24.0-24.9, adult    Nonsmoker    S/P gynecological surgery, follow-up exam  Comments:  pt  already s/p mid-urethral sling in 2015      S/P hysterectomy        Laure Frias MD  7/1/2020  14:18

## 2020-07-04 DIAGNOSIS — N30.00 ACUTE CYSTITIS WITHOUT HEMATURIA: Primary | ICD-10-CM

## 2020-07-04 LAB
APPEARANCE UR: CLEAR
BACTERIA #/AREA URNS HPF: ABNORMAL /HPF
BACTERIA UR CULT: ABNORMAL
BACTERIA UR CULT: ABNORMAL
BILIRUB UR QL STRIP: NEGATIVE
COLOR UR: ABNORMAL
EPI CELLS #/AREA URNS HPF: ABNORMAL /HPF
GLUCOSE UR QL: NEGATIVE
HGB UR QL STRIP: ABNORMAL
KETONES UR QL STRIP: NEGATIVE
LEUKOCYTE ESTERASE UR QL STRIP: ABNORMAL
NITRITE UR QL STRIP: NEGATIVE
OTHER ANTIBIOTIC SUSC ISLT: ABNORMAL
PH UR STRIP: 5.5 [PH] (ref 5–8)
PROT UR QL STRIP: NEGATIVE
RBC #/AREA URNS HPF: ABNORMAL /HPF
SP GR UR: 1.02 (ref 1–1.03)
UROBILINOGEN UR STRIP-MCNC: ABNORMAL MG/DL
WBC #/AREA URNS HPF: ABNORMAL /HPF

## 2020-07-04 RX ORDER — CIPROFLOXACIN 250 MG/1
250 TABLET, FILM COATED ORAL EVERY 12 HOURS SCHEDULED
Qty: 6 TABLET | Refills: 0 | Status: SHIPPED | OUTPATIENT
Start: 2020-07-04 | End: 2020-10-07

## 2020-07-04 NOTE — PROGRESS NOTES
Please let patient know that she has a UTI and that I have sent antibiotics to the pharmacy.  She should skip her Ca++ supplements on the days that she is taking the antibiotics.  Thx

## 2020-07-09 NOTE — PROGRESS NOTES
Pt notified of UTI and that meds have been called to her pharmacy. Pt also notified that she needs to stop her Ca while taking abx and can resume once she is finished. Pt understood. BS

## 2020-08-05 ENCOUNTER — TELEPHONE (OUTPATIENT)
Dept: OBSTETRICS AND GYNECOLOGY | Facility: CLINIC | Age: 60
End: 2020-08-05

## 2020-08-05 NOTE — TELEPHONE ENCOUNTER
Called and spoke with pt regarding todays appointment. I read last OV from July and pt had not yet done BHN testing. Pt scheduled on 9-24-20. Told pt she did not have to come in today unless she was having a new problem. Pt also scheduled for 2 week results OV on 10-7-20 @ 1:00pm. Pt notified and told her we could cancel todays visit. She understood. BS

## 2020-09-24 ENCOUNTER — OUTSIDE FACILITY SERVICE (OUTPATIENT)
Dept: OBSTETRICS AND GYNECOLOGY | Facility: CLINIC | Age: 60
End: 2020-09-24

## 2020-09-24 PROCEDURE — 51728 CYSTOMETROGRAM W/VP: CPT | Performed by: OBSTETRICS & GYNECOLOGY

## 2020-09-24 PROCEDURE — 51784 ANAL/URINARY MUSCLE STUDY: CPT | Performed by: OBSTETRICS & GYNECOLOGY

## 2020-09-24 PROCEDURE — 51741 ELECTRO-UROFLOWMETRY FIRST: CPT | Performed by: OBSTETRICS & GYNECOLOGY

## 2020-09-24 PROCEDURE — 51797 INTRAABDOMINAL PRESSURE TEST: CPT | Performed by: OBSTETRICS & GYNECOLOGY

## 2020-10-07 ENCOUNTER — OFFICE VISIT (OUTPATIENT)
Dept: OBSTETRICS AND GYNECOLOGY | Facility: CLINIC | Age: 60
End: 2020-10-07

## 2020-10-07 VITALS
HEIGHT: 60 IN | BODY MASS INDEX: 24.15 KG/M2 | WEIGHT: 123 LBS | DIASTOLIC BLOOD PRESSURE: 72 MMHG | SYSTOLIC BLOOD PRESSURE: 116 MMHG

## 2020-10-07 DIAGNOSIS — N32.81 OAB (OVERACTIVE BLADDER): ICD-10-CM

## 2020-10-07 DIAGNOSIS — R35.0 URINARY FREQUENCY: Primary | ICD-10-CM

## 2020-10-07 PROCEDURE — 99213 OFFICE O/P EST LOW 20 MIN: CPT | Performed by: OBSTETRICS & GYNECOLOGY

## 2020-10-07 RX ORDER — OXYBUTYNIN CHLORIDE 10 MG/1
10 TABLET, EXTENDED RELEASE ORAL DAILY
Qty: 30 TABLET | Refills: 2 | Status: SHIPPED | OUTPATIENT
Start: 2020-10-07 | End: 2020-11-18

## 2020-10-07 NOTE — PROGRESS NOTES
"Subjective   Chief Complaint   Patient presents with   • Urinary Frequency     pt here today for follow up on bladder testing. pt also voices still having urinary frequency. pt voices no other concerns.      Nikki Hutchins is a 59 y.o. year old .  No LMP recorded. Patient has had a hysterectomy.  She presents to discuss results of urodynamic testing.  Patient was determined to have OAB with only 150 ml of filling on her urodynamic testing.  In addition, culture at her last visit showed an E.coli UTI - patient was treated with cipro and reports that frequency was somewhat improved, but only for a short time.  She has not ever tried any anti-cholinergic medication.    The following portions of the patient's history were reviewed and updated as appropriate:current medications and allergies    Social History    Tobacco Use      Smoking status: Never Smoker      Smokeless tobacco: Never Used    Review of Systems   Constitutional: Negative for activity change and unexpected weight change.   Respiratory: Negative for shortness of breath.    Cardiovascular: Negative for chest pain.   Genitourinary: Positive for frequency. Negative for difficulty urinating, dysuria, pelvic pain and vaginal bleeding.         Objective   /72   Ht 152.4 cm (60\")   Wt 55.8 kg (123 lb)   BMI 24.02 kg/m²     Physical Exam  Vitals signs and nursing note reviewed.   Constitutional:       General: She is not in acute distress.     Appearance: She is well-developed.   HENT:      Head: Normocephalic and atraumatic.   Neck:      Musculoskeletal: Normal range of motion.      Thyroid: No thyromegaly.   Pulmonary:      Effort: Pulmonary effort is normal.   Abdominal:      General: There is no distension.      Palpations: Abdomen is soft.      Tenderness: There is no abdominal tenderness.   Musculoskeletal: Normal range of motion.   Skin:     General: Skin is warm and dry.   Neurological:      Mental Status: She is alert and oriented to " person, place, and time.   Psychiatric:         Behavior: Behavior normal.         Judgment: Judgment normal.         Lab Review   No data reviewed    Imaging   No data reviewed     Assessment & Plan  Nikki was seen today for urinary frequency.    Diagnoses and all orders for this visit:    Urinary frequency: Urodynamic results reviewed.  Patient drinks coffee every morning; mostly water for the rest of the day.  Patient advised to avoid common bladder irritants.  Will start with Ditropan.  Patient also offered physical therapy, but declines at this time.  RTO in 6 weeks to discuss Ditropan.  Patient reassured that if she does not feel like it is beneficial, there are several other medication options; Interstim not reviewed at this time.  -     oxybutynin XL (Ditropan XL) 10 MG 24 hr tablet; Take 1 tablet by mouth Daily.        This note was electronically signed.    Laure Frias MD  October 7, 2020  13:28 CDT    Total time spent today with Nikki  was 20 minutes (level 3).  Greater than 50% of the time was spent coordinating care, answering her questions and counseling regarding pathophysiology of her presenting problem along with plans for any diagnositc work-up and treatment.

## 2020-11-18 ENCOUNTER — OFFICE VISIT (OUTPATIENT)
Dept: OBSTETRICS AND GYNECOLOGY | Facility: CLINIC | Age: 60
End: 2020-11-18

## 2020-11-18 VITALS
WEIGHT: 122 LBS | HEIGHT: 60 IN | SYSTOLIC BLOOD PRESSURE: 104 MMHG | DIASTOLIC BLOOD PRESSURE: 68 MMHG | BODY MASS INDEX: 23.95 KG/M2

## 2020-11-18 DIAGNOSIS — N32.81 OAB (OVERACTIVE BLADDER): ICD-10-CM

## 2020-11-18 DIAGNOSIS — N39.0 URINARY TRACT INFECTION WITHOUT HEMATURIA, SITE UNSPECIFIED: Primary | ICD-10-CM

## 2020-11-18 DIAGNOSIS — R35.0 URINARY FREQUENCY: ICD-10-CM

## 2020-11-18 PROCEDURE — 99213 OFFICE O/P EST LOW 20 MIN: CPT | Performed by: OBSTETRICS & GYNECOLOGY

## 2020-11-20 LAB
APPEARANCE UR: ABNORMAL
BACTERIA #/AREA URNS HPF: ABNORMAL /HPF
BACTERIA UR CULT: ABNORMAL
BACTERIA UR CULT: ABNORMAL
BILIRUB UR QL STRIP: NEGATIVE
CASTS URNS MICRO: ABNORMAL
COLOR UR: YELLOW
EPI CELLS #/AREA URNS HPF: ABNORMAL /HPF
GLUCOSE UR QL: NEGATIVE
HGB UR QL STRIP: ABNORMAL
KETONES UR QL STRIP: NEGATIVE
LEUKOCYTE ESTERASE UR QL STRIP: ABNORMAL
NITRITE UR QL STRIP: POSITIVE
OTHER ANTIBIOTIC SUSC ISLT: ABNORMAL
PH UR STRIP: 5.5 [PH] (ref 5–8)
PROT UR QL STRIP: NEGATIVE
RBC #/AREA URNS HPF: ABNORMAL /HPF
SP GR UR: 1.02 (ref 1–1.03)
UROBILINOGEN UR STRIP-MCNC: ABNORMAL MG/DL
WBC #/AREA URNS HPF: ABNORMAL /HPF

## 2020-11-24 DIAGNOSIS — N30.00 ACUTE CYSTITIS WITHOUT HEMATURIA: Primary | ICD-10-CM

## 2020-11-24 RX ORDER — LEVOFLOXACIN 500 MG/1
500 TABLET, FILM COATED ORAL DAILY
Qty: 10 TABLET | Refills: 0 | Status: SHIPPED | OUTPATIENT
Start: 2020-11-24 | End: 2020-12-04

## 2020-11-24 NOTE — PROGRESS NOTES
Please let patient know that there are antibiotics waiting for her at the pharmacy.  She does have another UTI.  Also, she will need to hold her vitamins while she is taking the antibiotic.  Thanks

## 2020-12-22 ENCOUNTER — TELEPHONE (OUTPATIENT)
Dept: NEUROSURGERY | Age: 60
End: 2020-12-22

## 2020-12-22 NOTE — TELEPHONE ENCOUNTER
1st attempt to contact patient to schedule appointment. Voicemail box not set up. Unable to leave message.  12/22/20 @ 10:14 cw

## 2024-08-07 NOTE — PROGRESS NOTES
8/7/2024      Luz Elena Sampson  Attn Parmjit Sampson  [address]  [place] [address]        [facility]    Chief Complaint   Patient presents with     RECHECK     HPI:  Luz Elena Sampson is a 81 year old  (1942), who is being seen today for an episodic care visit at: [facility] ([facility]) [[id number]]. Today's concern is:   1. Chronic congestive heart failure, unspecified heart failure type (H)    2. Stage 3 chronic kidney disease, unspecified whether stage 3a or 3b CKD (H)    3. Peripheral vascular disease (H24)      Patient seen for follow up, reviewed recent labs with BNP 4415, creat 1.64 and GFR 41, lungs clear, no SOB with mild activity, able to accomplish ADL's, leg edema 2-3+, SBP's in the 100-130 range, fall risk, attempting to balance hypotension, edema, heart and renal function.    Allergies, and PMH/PSH reviewed in EPIC today.  REVIEW OF SYSTEMS:  4 point ROS including Respiratory, CV, GI and , other than that noted in the HPI,  is negative    Objective:   /66   Pulse 58   Temp 97.4  F (36.3  C)   Resp 18   Wt 113.4 kg (250 lb)   SpO2 94%   BMI 33.91 kg/m    GENERAL APPEARANCE:  in no distress, appears healthy  ENT:  Mouth and posterior oropharynx normal, moist mucous membranes  RESP:  lungs clear to auscultation , no respiratory distress  CV:  peripheral edema 2+ in lower legs, rate-normal  ABDOMEN:  bowel sounds normal  M/S:   Gait and station abnormal unsteady  SKIN:  Inspection of skin and subcutaneous tissue baseline  NEURO:   Examination of sensation by touch normal  PSYCH:  affect and mood normal    Labs done in SNF are in [facility]. Please refer to them using [employer] Everywhere.    Assessment/Plan:  (I50.9) Chronic congestive heart failure, unspecified heart failure type (H)  (primary encounter diagnosis)  Comment: BNP 4415, no SOB  Plan: continue xarelto, statin and bumex  -staff to check VS as scheduled  -repeat BNP in 3-6  "Subjective   Chief Complaint   Patient presents with   • Urinary Frequency     pt here today for follow up after starting oxybutynin. pt says that she is still having urinary frequency and says she doesnt know if the medication is helping. pt started taking  AZO again. she also voices that she has kept a log of how many times she has voided. pt voices not other concerns.       Nikki Hutchins is a 59 y.o. year old .  No LMP recorded. Patient has had a hysterectomy.  She presents to be seen for follow-up of OAB after starting ditropan.  Patient does not feel like Ditropan has been beneficial at all, and brings with her a tally of have any time she is getting up at night to avoid.  Some nights she is up 7 or 8 times just while trying to sleep; she voids just as frequently during the day.  The patient has made the suggested changes to her diet, and cut out all of her caffeine and artificial sweeteners.    The patient says she feels that she has a bladder infection now and says that she has this feeling at least every month.  She has recently started taking Azo-Standard again and this gives her some relief.    The following portions of the patient's history were reviewed and updated as appropriate:current medications and allergies    Social History    Tobacco Use      Smoking status: Never Smoker      Smokeless tobacco: Never Used    Review of Systems   Constitutional: Negative for activity change and unexpected weight change.   Respiratory: Negative for shortness of breath.    Cardiovascular: Negative for chest pain.   Genitourinary: Positive for dysuria, frequency and urgency. Negative for difficulty urinating, pelvic pain and vaginal bleeding.         Objective   /68   Ht 152.4 cm (60\")   Wt 55.3 kg (122 lb)   BMI 23.83 kg/m²     Physical Exam  Vitals signs and nursing note reviewed.   Constitutional:       General: She is not in acute distress.     Appearance: She is well-developed.   HENT:      " Head: Normocephalic and atraumatic.   Neck:      Musculoskeletal: Normal range of motion.      Thyroid: No thyromegaly.   Pulmonary:      Effort: Pulmonary effort is normal.   Abdominal:      General: There is no distension.      Palpations: Abdomen is soft.      Tenderness: There is no abdominal tenderness.   Genitourinary:     General: Normal vulva.      Vagina: No vaginal discharge.      Comments: PVR measured - 15 ml.  External  normal.  Speculum exam reveals no pelvic prolapse.  Vaginal mucosa unremarkable.  No prolapse of urethral meatus.  Musculoskeletal: Normal range of motion.   Skin:     General: Skin is warm and dry.   Neurological:      Mental Status: She is alert and oriented to person, place, and time.   Psychiatric:         Behavior: Behavior normal.         Judgment: Judgment normal.         Lab Review   UA and Urine culture    Imaging   No data reviewed     Assessment & Plan  Diagnoses and all orders for this visit:    1. Urinary tract infection without hematuria, site unspecified (Primary): Culture being sent  -     Urine Culture - Urine, Urine, Catheter In/Out  -     Urinalysis With Microscopic If Indicated (No Culture) - Urine, Catheter    2. Urinary frequency: Patient to discontinue oxybutynin.  She was given samples of Myrbetriq, 25 mg tablets, to last 2 weeks.  Patient will call in 2 weeks and let us know if that was effective; at that time we will send a prescription to the pharmacy.  If Myrbetriq is not helpful, we will begin to consider interstitial cystitis as the patient's diagnosis and start her on Elmiron therapy.    3. OAB (overactive bladder)    Other orders  -     Mirabegron ER (Myrbetriq) 25 MG tablet sustained-release 24 hour 24 hr tablet; Take 1 tablet by mouth Daily.  Dispense: 14 tablet; Refill: 0        This note was electronically signed.    Laure Frias MD  November 18, 2020  11:13 CST    Total time spent today with Nikki  was 20 minutes (level 3).  Greater than 50% of  months    (N18.30) Stage 3 chronic kidney disease, unspecified whether stage 3a or 3b CKD (H)  Comment: creat 1.64, GFR 41  Plan: dose meds renally  -keep bumex on board (see above)  -encourage fluids  -follow up BMP in 3-6 months    (I73.9) Peripheral vascular disease (H24)  Comment: poor circulation, 2+ edema, cellulitis risk, negative DVT  Plan: lymph eval and treat for edema  -encourage elevation  -continue bumex    MED REC REQUIRED  Post Medication Reconciliation Status: medication reconcilation previously completed during another office visit        Electronically signed by: JITENDRA Freitas CNP          Sincerely,        JITENDRA Freitas CNP       the time was spent coordinating care, answering her questions and counseling regarding pathophysiology of her presenting problem along with plans for any diagnositc work-up and treatment.

## 2024-10-28 ASSESSMENT — ENCOUNTER SYMPTOMS
ALLERGIC/IMMUNOLOGIC NEGATIVE: 1
GASTROINTESTINAL NEGATIVE: 1
RESPIRATORY NEGATIVE: 1
EYES NEGATIVE: 1

## 2024-10-28 NOTE — PROGRESS NOTES
Sara Persaud (:  1960) is a 63 y.o. female,Established patient, here for evaluation of the following chief complaint(s):  No chief complaint on file.         Assessment & Plan      No follow-ups on file.       Subjective   HPI    Review of Systems   Constitutional: Negative.    HENT: Negative.     Eyes: Negative.    Respiratory: Negative.     Cardiovascular: Negative.    Gastrointestinal: Negative.    Skin: Negative.    Allergic/Immunologic: Negative.    Neurological: Negative.    Psychiatric/Behavioral: Negative.            Objective   Physical Exam       {Time Documentation Optional:[id number]}      An electronic signature was used to authenticate this note.    --SENA KIMBROUGH MA

## 2024-10-29 ENCOUNTER — OFFICE VISIT (OUTPATIENT)
Age: 64
End: 2024-10-29
Payer: COMMERCIAL

## 2024-10-29 VITALS — HEIGHT: 60 IN | WEIGHT: 112 LBS | BODY MASS INDEX: 21.99 KG/M2

## 2024-10-29 DIAGNOSIS — M17.0 BILATERAL PRIMARY OSTEOARTHRITIS OF KNEE: Primary | ICD-10-CM

## 2024-10-29 PROCEDURE — 20610 DRAIN/INJ JOINT/BURSA W/O US: CPT | Performed by: PHYSICIAN ASSISTANT

## 2024-10-29 RX ORDER — BUPIVACAINE HYDROCHLORIDE 5 MG/ML
4 INJECTION, SOLUTION PERINEURAL ONCE
Status: COMPLETED | OUTPATIENT
Start: 2024-10-29 | End: 2024-10-29

## 2024-10-29 RX ORDER — TRIAMCINOLONE ACETONIDE 40 MG/ML
40 INJECTION, SUSPENSION INTRA-ARTICULAR; INTRAMUSCULAR ONCE
Status: COMPLETED | OUTPATIENT
Start: 2024-10-29 | End: 2024-10-29

## 2024-10-29 RX ADMIN — TRIAMCINOLONE ACETONIDE 40 MG: 40 INJECTION, SUSPENSION INTRA-ARTICULAR; INTRAMUSCULAR at 13:42

## 2024-10-29 RX ADMIN — BUPIVACAINE HYDROCHLORIDE 20 MG: 5 INJECTION, SOLUTION PERINEURAL at 13:41

## 2024-10-29 RX ADMIN — BUPIVACAINE HYDROCHLORIDE 20 MG: 5 INJECTION, SOLUTION PERINEURAL at 13:42

## 2024-10-29 RX ADMIN — TRIAMCINOLONE ACETONIDE 40 MG: 40 INJECTION, SUSPENSION INTRA-ARTICULAR; INTRAMUSCULAR at 13:43

## 2024-10-29 NOTE — PROGRESS NOTES
Patient: Sara Persaud   YOB: 1960  Age: 63 y.o.  Date: 10/29/2024       Chief Complaint   Patient presents with    Knee Pain     Bilateral        HPI:   bilateral Knee  Patient is a pleasant 63 y.o. female presenting to the clinic today for follow up on her bilateral knee osteoarthritis. She has been receiving steroid for quite some time which continues to provide her with meaningful relief. She is here today to have this updated.  She denies any new injury or trauma to the area.     Past Medical History:   Diagnosis Date    Bladder incontinence     Frequency of urination     Hyperlipidemia     Neuropathy     B feet and B hands    Osteoarthritis       Past Surgical History:   Procedure Laterality Date     SECTION      HYSTERECTOMY (CERVIX STATUS UNKNOWN)        Family History   Problem Relation Age of Onset    Arthritis Father     Heart Disease Brother       Current Outpatient Medications   Medication Sig Dispense Refill    oxybutynin (DITROPAN-XL) 10 MG CR tablet Take 1 tablet by mouth daily      aspirin 81 MG tablet Take 1 tablet by mouth daily      vitamin B-12 (CYANOCOBALAMIN) 1000 MCG tablet Take 5 tablets by mouth daily      Calcium Carbonate-Vitamin D (CALTRATE 600+D PO) Take 1 tablet by mouth.      Misc Natural Products (OSTEO BI-FLEX ADV JOINT SHIELD PO) Take 2 tablets by mouth daily.      estradiol (ESTRACE) 1 MG tablet Take 1 tablet by mouth daily. 90 tablet 3    naproxen (EC NAPROSYN) 500 MG EC tablet Take 1 tablet by mouth 2 times daily (with meals). 60 tablet 3    Elastic Bandages & Supports (WRIST SPLINT/COCK-UP/LEFT SM) MISC Use at night to reduce the numbness 1 each 0    Elastic Bandages & Supports (WRIST SPLINT/COCK-UP/RIGHT SM) MISC Use at night to reduce the numbness 1 each 0    tolterodine (DETROL LA) 4 MG ER capsule Take 1 capsule by mouth daily. At night for bladder control 30 capsule 3     Current Facility-Administered Medications   Medication Dose Route

## 2025-03-26 ENCOUNTER — OFFICE VISIT (OUTPATIENT)
Age: 65
End: 2025-03-26
Payer: MEDICARE

## 2025-03-26 VITALS — BODY MASS INDEX: 21.87 KG/M2 | HEIGHT: 60 IN | WEIGHT: 111.4 LBS

## 2025-03-26 DIAGNOSIS — M12.811 ROTATOR CUFF TEAR ARTHROPATHY OF RIGHT SHOULDER: ICD-10-CM

## 2025-03-26 DIAGNOSIS — M75.101 ROTATOR CUFF TEAR ARTHROPATHY OF RIGHT SHOULDER: ICD-10-CM

## 2025-03-26 DIAGNOSIS — M25.511 RIGHT SHOULDER PAIN, UNSPECIFIED CHRONICITY: Primary | ICD-10-CM

## 2025-03-26 PROCEDURE — G8420 CALC BMI NORM PARAMETERS: HCPCS | Performed by: ORTHOPAEDIC SURGERY

## 2025-03-26 PROCEDURE — 3017F COLORECTAL CA SCREEN DOC REV: CPT | Performed by: ORTHOPAEDIC SURGERY

## 2025-03-26 PROCEDURE — 1090F PRES/ABSN URINE INCON ASSESS: CPT | Performed by: ORTHOPAEDIC SURGERY

## 2025-03-26 PROCEDURE — 4004F PT TOBACCO SCREEN RCVD TLK: CPT | Performed by: ORTHOPAEDIC SURGERY

## 2025-03-26 PROCEDURE — G8400 PT W/DXA NO RESULTS DOC: HCPCS | Performed by: ORTHOPAEDIC SURGERY

## 2025-03-26 PROCEDURE — 1159F MED LIST DOCD IN RCRD: CPT | Performed by: ORTHOPAEDIC SURGERY

## 2025-03-26 PROCEDURE — 1123F ACP DISCUSS/DSCN MKR DOCD: CPT | Performed by: ORTHOPAEDIC SURGERY

## 2025-03-26 PROCEDURE — G8427 DOCREV CUR MEDS BY ELIG CLIN: HCPCS | Performed by: ORTHOPAEDIC SURGERY

## 2025-03-26 PROCEDURE — 99203 OFFICE O/P NEW LOW 30 MIN: CPT | Performed by: ORTHOPAEDIC SURGERY

## 2025-03-26 RX ORDER — SIMVASTATIN 10 MG
10 TABLET ORAL NIGHTLY
COMMUNITY

## 2025-03-26 RX ORDER — AMLODIPINE BESYLATE 5 MG/1
5 TABLET ORAL DAILY
COMMUNITY

## 2025-03-26 NOTE — PROGRESS NOTES
[facility]  [facility] ORTHOPED[facility]  [address]  [facility] [id number] [phone]         Sara Persaud (: 1960) is a 64 y.o. female, patient, here for evaluation of the following chief complaint(s): Shoulder Pain (Right shoulder)  .         Patient's PCP: Russell Leigh DO     Patient's Last Appointment in this Department was on 10/29/2024      Subjective:     Chief Complaint   Patient presents with    Shoulder Pain     Right shoulder        History of Present Illness  The patient is a 64-year-old female who presents for evaluation of right shoulder pain. She is accompanied by her  via phone.    The chief complaint is pain in the right shoulder, which intensifies upon elevation of the arm. The pain has been persistent for at least 2 years and appears to be progressively worsening. She describes a sensation of heaviness in the arm, particularly when attempting to lift objects. Additionally, occasional neck pain is reported. No injections or MRI have been performed for the shoulder. She has not sought physical therapy for the shoulder and has not been taking any medications such as Advil, Aleve, or Tylenol for the pain. An x-ray of the shoulder was performed today. She expresses a preference for non-surgical treatment options and wishes to discuss potential treatments with her .    PAST SURGICAL HISTORY:  She has previously sought medical attention for back-related issues.    SOCIAL HISTORY  Marital Status:   Tobacco: Does not smoke      FAMILY HISTORY  - Father: Severe arthritis, sometimes cannot hold or feel objects              Medications  Current Outpatient Medications   Medication Sig Dispense Refill    amLODIPine (NORVASC) 5 MG tablet Take 1 tablet by mouth daily      simvastatin (ZOCOR) 10 MG tablet Take 1 tablet by mouth nightly      oxybutynin (DITROPAN-XL) 10 MG CR tablet Take 1 tablet by mouth daily

## 2025-04-03 ENCOUNTER — PREP FOR PROCEDURE (OUTPATIENT)
Age: 65
End: 2025-04-03

## 2025-04-03 DIAGNOSIS — G89.29 CHRONIC RIGHT SHOULDER PAIN: ICD-10-CM

## 2025-04-03 DIAGNOSIS — Z00.00 PREVENTATIVE HEALTH CARE: ICD-10-CM

## 2025-04-03 DIAGNOSIS — M25.511 CHRONIC RIGHT SHOULDER PAIN: ICD-10-CM

## 2025-04-03 DIAGNOSIS — T40.2X1A OPIOID OVERDOSE, ACCIDENTAL OR UNINTENTIONAL, INITIAL ENCOUNTER (HCC): Primary | ICD-10-CM

## 2025-04-04 RX ORDER — CELECOXIB 100 MG/1
200 CAPSULE ORAL ONCE
Status: CANCELLED | OUTPATIENT
Start: 2025-05-08 | End: 2025-05-08

## 2025-04-04 RX ORDER — TRANEXAMIC ACID 650 MG/1
1950 TABLET ORAL ONCE
Status: CANCELLED | OUTPATIENT
Start: 2025-05-08 | End: 2025-05-08

## 2025-04-04 RX ORDER — ACETAMINOPHEN 325 MG/1
1000 TABLET ORAL ONCE
Status: CANCELLED | OUTPATIENT
Start: 2025-05-08 | End: 2025-05-08

## 2025-04-04 RX ORDER — OXYCODONE HCL 10 MG/1
10 TABLET, FILM COATED, EXTENDED RELEASE ORAL ONCE
Status: CANCELLED | OUTPATIENT
Start: 2025-05-08 | End: 2025-05-08

## 2025-04-04 RX ORDER — SCOPOLAMINE 1 MG/3D
1 PATCH, EXTENDED RELEASE TRANSDERMAL ONCE
Status: CANCELLED | OUTPATIENT
Start: 2025-05-08 | End: 2025-05-08

## 2025-04-07 RX ORDER — MUPIROCIN 20 MG/G
OINTMENT TOPICAL
Qty: 30 G | Refills: 0 | Status: SHIPPED | OUTPATIENT
Start: 2025-04-07

## 2025-04-18 ENCOUNTER — HOSPITAL ENCOUNTER (OUTPATIENT)
Dept: PREADMISSION TESTING | Age: 65
Discharge: HOME | End: 2025-04-22
Payer: MEDICARE

## 2025-04-18 LAB
ANION GAP SERPL CALCULATED.3IONS-SCNC: 11 MMOL/L (ref 8–16)
BUN SERPL-MCNC: 18 MG/DL (ref 8–23)
CALCIUM SERPL-MCNC: 9.6 MG/DL (ref 8.8–10.2)
CHLORIDE SERPL-SCNC: 104 MMOL/L (ref 98–107)
CO2 SERPL-SCNC: 26 MMOL/L (ref 22–29)
CREAT SERPL-MCNC: 0.7 MG/DL (ref 0.5–0.9)
ERYTHROCYTE [DISTWIDTH] IN BLOOD BY AUTOMATED COUNT: 12.5 % (ref 11.5–14.5)
GLUCOSE SERPL-MCNC: 92 MG/DL (ref 70–99)
HCT VFR BLD AUTO: 40.9 % (ref 37–47)
HGB BLD-MCNC: 13.3 G/DL (ref 12–16)
MCH RBC QN AUTO: 30.2 PG (ref 27–31)
MCHC RBC AUTO-ENTMCNC: 32.5 G/DL (ref 33–37)
MCV RBC AUTO: 92.7 FL (ref 81–99)
MRSA DNA SPEC QL NAA+PROBE: NOT DETECTED
PLATELET # BLD AUTO: 322 K/UL (ref 130–400)
PMV BLD AUTO: 8.2 FL (ref 9.4–12.3)
POTASSIUM SERPL-SCNC: 4.2 MMOL/L (ref 3.5–5.1)
RBC # BLD AUTO: 4.41 M/UL (ref 4.2–5.4)
SODIUM SERPL-SCNC: 141 MMOL/L (ref 136–145)
WBC # BLD AUTO: 6.5 K/UL (ref 4.8–10.8)

## 2025-04-18 PROCEDURE — 80048 BASIC METABOLIC PNL TOTAL CA: CPT

## 2025-04-18 PROCEDURE — 85027 COMPLETE CBC AUTOMATED: CPT

## 2025-04-18 PROCEDURE — 93005 ELECTROCARDIOGRAM TRACING: CPT | Performed by: ANESTHESIOLOGY

## 2025-04-18 PROCEDURE — 87641 MR-STAPH DNA AMP PROBE: CPT

## 2025-04-18 RX ORDER — GUAIFENESIN 600 MG/1
1200 TABLET, EXTENDED RELEASE ORAL 2 TIMES DAILY
COMMUNITY

## 2025-04-18 RX ORDER — LEVOCETIRIZINE DIHYDROCHLORIDE 5 MG/1
5 TABLET, FILM COATED ORAL NIGHTLY
COMMUNITY

## 2025-04-18 NOTE — DISCHARGE INSTRUCTIONS
YOUR HAIR OR FACE WITH THIS SOAP.  When washing with this soap, apply enough to suds up the body thoroughly, turn the water away from your body and allow the soap suds to remain on the body for 2 full minutes, then rinse body completely.      After using this soap on the body, please do not apply powders or lotions to your body.  After the shower the night before surgery, please dry off with a clean towel, sleep in freshly laundered pj's, and change your bed linen before going to sleep.      IF YOU HAVE A PET IN YOUR HOME, please do not allow your pet to sleep in the bed with you after you have showered with your surgery prep soap.     Please remember that it is not recommended to allow your pet to sleep with you post op, until your incision has healed.  This can increase your risk of post op infection.        [facility] Visitor Policy for Surgery Patients-Revised 6-    Visitors for surgery patients are essential for the patient's emotional well-being and care       post operatively.    2.   Visitor Expectations and Limitations    3.  One visitor allowed with patients in the preop/postop rooms.    4.  A second visitor may sit in the waiting area.    5.  No children under 13 allowed in the pre-post op areas unless they are the patient.    6.  Two people may be with an underage surgical/procedural patient in preop/postop        room.      7.  If you are admitted to the hospital post operatively, there are NO RESTRICTIONS on       the floor at this time.      8.  If you are admitted to ICU postoperatively, you may have one visitor in the room from        7A-7P.  A second visitor may sit in the ICU waiting room.  No overnight visitors in         ICU waiting room.

## 2025-04-19 LAB
EKG P AXIS: 68 DEGREES
EKG P-R INTERVAL: 182 MS
EKG Q-T INTERVAL: 418 MS
EKG QRS DURATION: 90 MS
EKG QTC CALCULATION (BAZETT): 426 MS
EKG T AXIS: 53 DEGREES

## 2025-04-19 PROCEDURE — 93010 ELECTROCARDIOGRAM REPORT: CPT | Performed by: INTERNAL MEDICINE

## 2025-04-28 ENCOUNTER — TELEPHONE (OUTPATIENT)
Age: 65
End: 2025-04-28

## 2025-04-28 DIAGNOSIS — G89.29 CHRONIC RIGHT SHOULDER PAIN: ICD-10-CM

## 2025-04-28 DIAGNOSIS — Z00.00 PREVENTATIVE HEALTH CARE: ICD-10-CM

## 2025-04-28 DIAGNOSIS — M25.511 CHRONIC RIGHT SHOULDER PAIN: ICD-10-CM

## 2025-04-28 DIAGNOSIS — T40.2X1A OPIOID OVERDOSE, ACCIDENTAL OR UNINTENTIONAL, INITIAL ENCOUNTER (HCC): ICD-10-CM

## 2025-04-28 RX ORDER — MUPIROCIN 20 MG/G
OINTMENT TOPICAL
Qty: 30 G | Refills: 0 | Status: SHIPPED | OUTPATIENT
Start: 2025-04-28

## 2025-04-28 NOTE — TELEPHONE ENCOUNTER
Patient  reported that her pre surgical medication was sent to the wrong pharmacy They would like it sent to [facility] pharmacy in [place]. Cancelled the mupirocin and Narcan that was sent to [facility] in [place] and sent new medication to [facility]

## 2025-05-07 NOTE — DISCHARGE INSTRUCTIONS
[facility]  Dr. Arthur Pressley     Total or Reverse Shoulder Replacement  Discharge Instructions    Surgical Site Care:  Remove big bandaid on the morning after surgery but leave glue strip in place until follow up  Showering is permitted on post op day 2 - Saturday (Glue strip can get wet in the shower)    Pain Medications  You were given a prescription to take to your pharmacy  Wean off pain medications as you deem appropriate as long as pain is under control  Take tylenol instead of prescribed pain med as you improve    Cold packs  May be used as necessary    Contact office if  Increased redness, swelling, drainage of any kind, and/or severe pain at surgery site.  As well as new onset fevers and or chills.  These could signify an infection.  Any rash appears, increased  or new onset nausea/vomiting occur.  This may indicate a reaction to a medication.     You may remove immobilizer for light activity when seated  No lifting pushing or pulling with operated extremity  Please take a stool softener such as dulcolax or colace daily  Follow up with Surgeon at scheduled appointment time.    My office medical assistant can be reached at [phone] opt. 3.  Leave her a voicemail and she will get back with you promptly.  If you have an absolute emergency, TEXT me with your name and problem at [phone].  Thanks!

## 2025-05-07 NOTE — DISCHARGE INSTR - DIET

## 2025-05-08 ENCOUNTER — ANESTHESIA (OUTPATIENT)
Dept: OPERATING ROOM | Age: 65
End: 2025-05-08
Payer: MEDICARE

## 2025-05-08 ENCOUNTER — APPOINTMENT (OUTPATIENT)
Dept: GENERAL RADIOLOGY | Age: 65
End: 2025-05-08
Attending: ORTHOPAEDIC SURGERY
Payer: MEDICARE

## 2025-05-08 ENCOUNTER — HOSPITAL ENCOUNTER (OUTPATIENT)
Age: 65
Setting detail: OUTPATIENT SURGERY
Discharge: HOME | End: 2025-05-08
Attending: ORTHOPAEDIC SURGERY | Admitting: ORTHOPAEDIC SURGERY
Payer: MEDICARE

## 2025-05-08 ENCOUNTER — ANESTHESIA EVENT (OUTPATIENT)
Dept: OPERATING ROOM | Age: 65
End: 2025-05-08
Payer: MEDICARE

## 2025-05-08 VITALS
DIASTOLIC BLOOD PRESSURE: 90 MMHG | TEMPERATURE: 97.1 F | HEART RATE: 73 BPM | OXYGEN SATURATION: 93 % | HEIGHT: 60 IN | SYSTOLIC BLOOD PRESSURE: 129 MMHG | RESPIRATION RATE: 16 BRPM | WEIGHT: 111 LBS | BODY MASS INDEX: 21.79 KG/M2

## 2025-05-08 DIAGNOSIS — M75.101 ROTATOR CUFF TEAR ARTHROPATHY OF RIGHT SHOULDER: Primary | ICD-10-CM

## 2025-05-08 DIAGNOSIS — M12.811 ROTATOR CUFF TEAR ARTHROPATHY OF RIGHT SHOULDER: Primary | ICD-10-CM

## 2025-05-08 PROCEDURE — C1713 ANCHOR/SCREW BN/BN,TIS/BN: HCPCS | Performed by: ORTHOPAEDIC SURGERY

## 2025-05-08 PROCEDURE — 73030 X-RAY EXAM OF SHOULDER: CPT

## 2025-05-08 PROCEDURE — 2580000003 HC RX 258: Performed by: ANESTHESIOLOGY

## 2025-05-08 PROCEDURE — 6360000002 HC RX W HCPCS: Performed by: ANESTHESIOLOGY

## 2025-05-08 PROCEDURE — 2500000003 HC RX 250 WO HCPCS

## 2025-05-08 PROCEDURE — 3700000000 HC ANESTHESIA ATTENDED CARE: Performed by: ORTHOPAEDIC SURGERY

## 2025-05-08 PROCEDURE — 3700000001 HC ADD 15 MINUTES (ANESTHESIA): Performed by: ORTHOPAEDIC SURGERY

## 2025-05-08 PROCEDURE — 3600000005 HC SURGERY LEVEL 5 BASE: Performed by: ORTHOPAEDIC SURGERY

## 2025-05-08 PROCEDURE — C1776 JOINT DEVICE (IMPLANTABLE): HCPCS | Performed by: ORTHOPAEDIC SURGERY

## 2025-05-08 PROCEDURE — 3600000015 HC SURGERY LEVEL 5 ADDTL 15MIN: Performed by: ORTHOPAEDIC SURGERY

## 2025-05-08 PROCEDURE — 6360000002 HC RX W HCPCS: Performed by: ORTHOPAEDIC SURGERY

## 2025-05-08 PROCEDURE — L3650 SO 8 ABD RESTRAINT PRE OTS: HCPCS | Performed by: ORTHOPAEDIC SURGERY

## 2025-05-08 PROCEDURE — 2500000003 HC RX 250 WO HCPCS: Performed by: ORTHOPAEDIC SURGERY

## 2025-05-08 PROCEDURE — 2709999900 HC NON-CHARGEABLE SUPPLY: Performed by: ORTHOPAEDIC SURGERY

## 2025-05-08 PROCEDURE — 2720000010 HC SURG SUPPLY STERILE: Performed by: ORTHOPAEDIC SURGERY

## 2025-05-08 PROCEDURE — 7100000000 HC PACU RECOVERY - FIRST 15 MIN: Performed by: ORTHOPAEDIC SURGERY

## 2025-05-08 PROCEDURE — 6370000000 HC RX 637 (ALT 250 FOR IP): Performed by: ORTHOPAEDIC SURGERY

## 2025-05-08 PROCEDURE — 6360000002 HC RX W HCPCS

## 2025-05-08 PROCEDURE — 7100000010 HC PHASE II RECOVERY - FIRST 15 MIN: Performed by: ORTHOPAEDIC SURGERY

## 2025-05-08 PROCEDURE — 7100000001 HC PACU RECOVERY - ADDTL 15 MIN: Performed by: ORTHOPAEDIC SURGERY

## 2025-05-08 PROCEDURE — 7100000011 HC PHASE II RECOVERY - ADDTL 15 MIN: Performed by: ORTHOPAEDIC SURGERY

## 2025-05-08 PROCEDURE — 23472 RECONSTRUCT SHOULDER JOINT: CPT | Performed by: ORTHOPAEDIC SURGERY

## 2025-05-08 PROCEDURE — 64415 NJX AA&/STRD BRCH PLXS IMG: CPT

## 2025-05-08 DEVICE — SPHERE GLEN DIA36MM REG STD CO CHROM TAPR FIT FOR COMPHSVE: Type: IMPLANTABLE DEVICE | Site: SHOULDER | Status: FUNCTIONAL

## 2025-05-08 DEVICE — SCREW BNE L25MM DIA4.75MM HD DIA3.5MM CORT FIX ANG: Type: IMPLANTABLE DEVICE | Site: SHOULDER | Status: FUNCTIONAL

## 2025-05-08 DEVICE — IMPLANTABLE DEVICE: Type: IMPLANTABLE DEVICE | Site: SHOULDER | Status: FUNCTIONAL

## 2025-05-08 DEVICE — SCREW BNE L25MM DIA6.5MM HEX HD DIA3.5MM FOR COMPHSVE CONV: Type: IMPLANTABLE DEVICE | Site: SHOULDER | Status: FUNCTIONAL

## 2025-05-08 DEVICE — SCREW BNE L15MM DIA4.75MM HD DIA3.5MM CORT VAR ANG: Type: IMPLANTABLE DEVICE | Site: SHOULDER | Status: FUNCTIONAL

## 2025-05-08 DEVICE — IMPLANTABLE DEVICE
Type: IMPLANTABLE DEVICE | Site: SHOULDER | Status: FUNCTIONAL
Brand: STABLECUT®

## 2025-05-08 DEVICE — BEARING HUM STD 36 MM SHLDR VIVACIT-E PROLONG COMPHSVE: Type: IMPLANTABLE DEVICE | Site: SHOULDER | Status: FUNCTIONAL

## 2025-05-08 DEVICE — SCREW BNE L20MM DIA4.75MM HD DIA3.5MM CORT FIX ANG: Type: IMPLANTABLE DEVICE | Site: SHOULDER | Status: FUNCTIONAL

## 2025-05-08 RX ORDER — CELECOXIB 200 MG/1
200 CAPSULE ORAL ONCE
Status: COMPLETED | OUTPATIENT
Start: 2025-05-08 | End: 2025-05-08

## 2025-05-08 RX ORDER — OXYCODONE HYDROCHLORIDE 5 MG/1
5 TABLET ORAL EVERY 4 HOURS PRN
Qty: 40 TABLET | Refills: 0 | Status: SHIPPED | OUTPATIENT
Start: 2025-05-08 | End: 2025-05-12 | Stop reason: SDUPTHER

## 2025-05-08 RX ORDER — FENTANYL CITRATE 50 UG/ML
INJECTION, SOLUTION INTRAMUSCULAR; INTRAVENOUS
Status: DISCONTINUED | OUTPATIENT
Start: 2025-05-08 | End: 2025-05-08 | Stop reason: SDUPTHER

## 2025-05-08 RX ORDER — ASPIRIN 81 MG/1
81 TABLET, CHEWABLE ORAL 2 TIMES DAILY
Qty: 60 TABLET | Refills: 0 | Status: SHIPPED | OUTPATIENT
Start: 2025-05-08

## 2025-05-08 RX ORDER — NALOXONE HYDROCHLORIDE 0.4 MG/ML
INJECTION, SOLUTION INTRAMUSCULAR; INTRAVENOUS; SUBCUTANEOUS PRN
Status: CANCELLED | OUTPATIENT
Start: 2025-05-08

## 2025-05-08 RX ORDER — SODIUM CHLORIDE 0.9 % (FLUSH) 0.9 %
5-40 SYRINGE (ML) INJECTION PRN
Status: DISCONTINUED | OUTPATIENT
Start: 2025-05-08 | End: 2025-05-08 | Stop reason: HOSPADM

## 2025-05-08 RX ORDER — SODIUM CHLORIDE 0.9 % (FLUSH) 0.9 %
5-40 SYRINGE (ML) INJECTION EVERY 12 HOURS SCHEDULED
Status: DISCONTINUED | OUTPATIENT
Start: 2025-05-08 | End: 2025-05-08 | Stop reason: HOSPADM

## 2025-05-08 RX ORDER — DOXYCYCLINE HYCLATE 100 MG
100 TABLET ORAL 2 TIMES DAILY
Qty: 14 TABLET | Refills: 0 | Status: SHIPPED | OUTPATIENT
Start: 2025-05-08 | End: 2025-05-15

## 2025-05-08 RX ORDER — SCOPOLAMINE 1 MG/3D
1 PATCH, EXTENDED RELEASE TRANSDERMAL ONCE
Status: DISCONTINUED | OUTPATIENT
Start: 2025-05-08 | End: 2025-05-08 | Stop reason: HOSPADM

## 2025-05-08 RX ORDER — TRANEXAMIC ACID 650 MG/1
1950 TABLET ORAL ONCE
Status: COMPLETED | OUTPATIENT
Start: 2025-05-08 | End: 2025-05-08

## 2025-05-08 RX ORDER — APREPITANT 40 MG/1
40 CAPSULE ORAL ONCE
Status: COMPLETED | OUTPATIENT
Start: 2025-05-08 | End: 2025-05-08

## 2025-05-08 RX ORDER — SODIUM CHLORIDE, SODIUM LACTATE, POTASSIUM CHLORIDE, CALCIUM CHLORIDE 600; 310; 30; 20 MG/100ML; MG/100ML; MG/100ML; MG/100ML
INJECTION, SOLUTION INTRAVENOUS CONTINUOUS
Status: DISCONTINUED | OUTPATIENT
Start: 2025-05-08 | End: 2025-05-08 | Stop reason: HOSPADM

## 2025-05-08 RX ORDER — SODIUM CHLORIDE 9 MG/ML
INJECTION, SOLUTION INTRAVENOUS PRN
Status: CANCELLED | OUTPATIENT
Start: 2025-05-08

## 2025-05-08 RX ORDER — ONDANSETRON 2 MG/ML
INJECTION INTRAMUSCULAR; INTRAVENOUS
Status: DISCONTINUED | OUTPATIENT
Start: 2025-05-08 | End: 2025-05-08 | Stop reason: SDUPTHER

## 2025-05-08 RX ORDER — SODIUM CHLORIDE 0.9 % (FLUSH) 0.9 %
5-40 SYRINGE (ML) INJECTION EVERY 12 HOURS SCHEDULED
Status: CANCELLED | OUTPATIENT
Start: 2025-05-08

## 2025-05-08 RX ORDER — EPHEDRINE SULFATE/0.9% NACL/PF 25 MG/5 ML
SYRINGE (ML) INTRAVENOUS
Status: DISCONTINUED | OUTPATIENT
Start: 2025-05-08 | End: 2025-05-08 | Stop reason: SDUPTHER

## 2025-05-08 RX ORDER — OXYCODONE HCL 10 MG/1
10 TABLET, FILM COATED, EXTENDED RELEASE ORAL ONCE
Refills: 0 | Status: COMPLETED | OUTPATIENT
Start: 2025-05-08 | End: 2025-05-08

## 2025-05-08 RX ORDER — LIDOCAINE HYDROCHLORIDE 10 MG/ML
INJECTION, SOLUTION INFILTRATION; PERINEURAL
Status: DISCONTINUED | OUTPATIENT
Start: 2025-05-08 | End: 2025-05-08 | Stop reason: SDUPTHER

## 2025-05-08 RX ORDER — BUPIVACAINE HYDROCHLORIDE 5 MG/ML
INJECTION, SOLUTION EPIDURAL; INTRACAUDAL; PERINEURAL
Status: COMPLETED | OUTPATIENT
Start: 2025-05-08 | End: 2025-05-08

## 2025-05-08 RX ORDER — DEXAMETHASONE SODIUM PHOSPHATE 10 MG/ML
10 INJECTION, SOLUTION INTRAMUSCULAR; INTRAVENOUS ONCE
Status: COMPLETED | OUTPATIENT
Start: 2025-05-08 | End: 2025-05-08

## 2025-05-08 RX ORDER — SODIUM CHLORIDE 0.9 % (FLUSH) 0.9 %
5-40 SYRINGE (ML) INJECTION PRN
Status: CANCELLED | OUTPATIENT
Start: 2025-05-08

## 2025-05-08 RX ORDER — MIDAZOLAM HYDROCHLORIDE 1 MG/ML
2 INJECTION, SOLUTION INTRAMUSCULAR; INTRAVENOUS ONCE
Status: COMPLETED | OUTPATIENT
Start: 2025-05-08 | End: 2025-05-08

## 2025-05-08 RX ORDER — ONDANSETRON 2 MG/ML
4 INJECTION INTRAMUSCULAR; INTRAVENOUS
Status: COMPLETED | OUTPATIENT
Start: 2025-05-08 | End: 2025-05-08

## 2025-05-08 RX ORDER — BUPIVACAINE HYDROCHLORIDE 5 MG/ML
30 INJECTION, SOLUTION EPIDURAL; INTRACAUDAL; PERINEURAL ONCE
Status: DISCONTINUED | OUTPATIENT
Start: 2025-05-08 | End: 2025-05-08 | Stop reason: HOSPADM

## 2025-05-08 RX ORDER — SODIUM CHLORIDE 9 MG/ML
INJECTION, SOLUTION INTRAVENOUS PRN
Status: DISCONTINUED | OUTPATIENT
Start: 2025-05-08 | End: 2025-05-08 | Stop reason: HOSPADM

## 2025-05-08 RX ORDER — HYDROMORPHONE HYDROCHLORIDE 1 MG/ML
0.5 INJECTION, SOLUTION INTRAMUSCULAR; INTRAVENOUS; SUBCUTANEOUS EVERY 5 MIN PRN
Status: DISCONTINUED | OUTPATIENT
Start: 2025-05-08 | End: 2025-05-08 | Stop reason: HOSPADM

## 2025-05-08 RX ORDER — PROPOFOL 10 MG/ML
INJECTION, EMULSION INTRAVENOUS
Status: DISCONTINUED | OUTPATIENT
Start: 2025-05-08 | End: 2025-05-08 | Stop reason: SDUPTHER

## 2025-05-08 RX ORDER — HYDROMORPHONE HYDROCHLORIDE 1 MG/ML
0.25 INJECTION, SOLUTION INTRAMUSCULAR; INTRAVENOUS; SUBCUTANEOUS EVERY 5 MIN PRN
Status: DISCONTINUED | OUTPATIENT
Start: 2025-05-08 | End: 2025-05-08 | Stop reason: HOSPADM

## 2025-05-08 RX ORDER — ACETAMINOPHEN 500 MG
1000 TABLET ORAL ONCE
Status: COMPLETED | OUTPATIENT
Start: 2025-05-08 | End: 2025-05-08

## 2025-05-08 RX ORDER — ROCURONIUM BROMIDE 10 MG/ML
INJECTION, SOLUTION INTRAVENOUS
Status: DISCONTINUED | OUTPATIENT
Start: 2025-05-08 | End: 2025-05-08 | Stop reason: SDUPTHER

## 2025-05-08 RX ADMIN — BUPIVACAINE 10 ML: 13.3 INJECTION, SUSPENSION, LIPOSOMAL INFILTRATION at 11:24

## 2025-05-08 RX ADMIN — CEFAZOLIN 2000 MG: 1 INJECTION, POWDER, FOR SOLUTION INTRAMUSCULAR; INTRAVENOUS at 12:15

## 2025-05-08 RX ADMIN — MIDAZOLAM 2 MG: 1 INJECTION INTRAMUSCULAR; INTRAVENOUS at 11:22

## 2025-05-08 RX ADMIN — FENTANYL CITRATE 50 MCG: 0.05 INJECTION, SOLUTION INTRAMUSCULAR; INTRAVENOUS at 12:38

## 2025-05-08 RX ADMIN — ACETAMINOPHEN 1000 MG: 500 TABLET ORAL at 10:09

## 2025-05-08 RX ADMIN — APREPITANT 40 MG: 40 CAPSULE ORAL at 11:14

## 2025-05-08 RX ADMIN — ONDANSETRON 4 MG: 2 INJECTION INTRAMUSCULAR; INTRAVENOUS at 13:14

## 2025-05-08 RX ADMIN — CELECOXIB 200 MG: 200 CAPSULE ORAL at 10:13

## 2025-05-08 RX ADMIN — TRANEXAMIC ACID 1950 MG: 650 TABLET ORAL at 10:04

## 2025-05-08 RX ADMIN — PROPOFOL 100 MG: 10 INJECTION, EMULSION INTRAVENOUS at 11:57

## 2025-05-08 RX ADMIN — FENTANYL CITRATE 50 MCG: 0.05 INJECTION, SOLUTION INTRAMUSCULAR; INTRAVENOUS at 11:54

## 2025-05-08 RX ADMIN — BUPIVACAINE HYDROCHLORIDE 10 ML: 5 INJECTION, SOLUTION EPIDURAL; INTRACAUDAL; PERINEURAL at 11:24

## 2025-05-08 RX ADMIN — EPHEDRINE SULFATE 10 MG: 5 INJECTION INTRAVENOUS at 12:18

## 2025-05-08 RX ADMIN — ONDANSETRON 4 MG: 2 INJECTION, SOLUTION INTRAMUSCULAR; INTRAVENOUS at 15:00

## 2025-05-08 RX ADMIN — SODIUM CHLORIDE, PRESERVATIVE FREE 20 MG: 5 INJECTION INTRAVENOUS at 11:14

## 2025-05-08 RX ADMIN — SODIUM CHLORIDE, SODIUM LACTATE, POTASSIUM CHLORIDE, AND CALCIUM CHLORIDE: .6; .31; .03; .02 INJECTION, SOLUTION INTRAVENOUS at 10:01

## 2025-05-08 RX ADMIN — ROCURONIUM BROMIDE 50 MG: 10 INJECTION, SOLUTION INTRAVENOUS at 11:57

## 2025-05-08 RX ADMIN — DEXAMETHASONE SODIUM PHOSPHATE 10 MG: 10 INJECTION, SOLUTION INTRAMUSCULAR; INTRAVENOUS at 12:15

## 2025-05-08 RX ADMIN — SUGAMMADEX 200 MG: 100 INJECTION, SOLUTION INTRAVENOUS at 13:30

## 2025-05-08 RX ADMIN — OXYCODONE HYDROCHLORIDE 10 MG: 10 TABLET, FILM COATED, EXTENDED RELEASE ORAL at 10:10

## 2025-05-08 RX ADMIN — EPHEDRINE SULFATE 10 MG: 5 INJECTION INTRAVENOUS at 12:22

## 2025-05-08 RX ADMIN — LIDOCAINE HYDROCHLORIDE 50 MG: 10 INJECTION, SOLUTION INFILTRATION; PERINEURAL at 11:57

## 2025-05-08 ASSESSMENT — PAIN - FUNCTIONAL ASSESSMENT
PAIN_FUNCTIONAL_ASSESSMENT: NONE - DENIES PAIN
PAIN_FUNCTIONAL_ASSESSMENT: NONE - DENIES PAIN
PAIN_FUNCTIONAL_ASSESSMENT: ADULT NONVERBAL PAIN SCALE (NPVS)
PAIN_FUNCTIONAL_ASSESSMENT: 0-10
PAIN_FUNCTIONAL_ASSESSMENT: NONE - DENIES PAIN

## 2025-05-08 ASSESSMENT — LIFESTYLE VARIABLES: SMOKING_STATUS: 0

## 2025-05-08 NOTE — ANESTHESIA PROCEDURE NOTES
Peripheral Block    Patient location during procedure: holding area  Reason for block: post-op pain management  Start time: 5/8/2025 11:24 AM  End time: 5/8/2025 11:26 AM  Staffing  Performed: anesthesiologist   Anesthesiologist: Brandyn Mireles MD  Performed by: Brandyn Mireles MD  Authorized by: Brandyn Mireles MD    Preanesthetic Checklist  Completed: patient identified, IV checked, site marked, risks and benefits discussed, surgical/procedural consents, equipment checked, pre-op evaluation, timeout performed, anesthesia consent given, oxygen available, monitors applied/VS acknowledged, fire risk safety assessment completed and verbalized and blood product R/B/A discussed and consented  Peripheral Block   Patient position: supine  Prep: ChloraPrep  Provider prep: mask and sterile gloves  Patient monitoring: continuous pulse ox and frequent blood pressure checks  Block type: Brachial plexus  Interscalene  Laterality: right  Injection technique: single-shot  Guidance: ultrasound guided  Local infiltration: lidocaine  Local infiltration: lidocaine    Needle   Needle type: Quincke   Needle gauge: 20 G  Needle localization: ultrasound guidance  Needle length: 10 cm  Assessment   Injection assessment: negative aspiration for heme, no paresthesia on injection, local visualized surrounding nerve on ultrasound and no intravascular symptoms  Paresthesia pain: none  Slow fractionated injection: yes  Hemodynamics: stable  Outcomes: uncomplicated and patient tolerated procedure well    Medications Administered  BUPivacaine (MARCAINE) PF injection 0.5% - Perineural   10 mL - 5/8/2025 11:24:00 AM  BUPivacaine liposome (EXPAREL) injection 1.3% - Perineural   10 mL - 5/8/2025 11:24:00 AM

## 2025-05-08 NOTE — ANESTHESIA PRE PROCEDURE
Department of Anesthesiology  Preprocedure Note       Name:  Sara Persaud   Age:  64 y.o.  :  1960                                          MRN:  [id number]         Date:  2025      Surgeon: Surgeon(s):  Jeremy Walsh MD    Procedure: Procedure(s):  RIGHT REVERSE SHOULDER TOTAL ARTHROPLASTY    Medications prior to admission:   Prior to Admission medications    Medication Sig Start Date End Date Taking? Authorizing Provider   oxyCODONE (ROXICODONE) 5 MG immediate release tablet Take 1 tablet by mouth every 4 hours as needed for Pain for up to 10 days. Max Daily Amount: 30 mg 25 Yes Jeremy Walsh MD   aspirin (ASPIRIN CHILDRENS) 81 MG chewable tablet Take 1 tablet by mouth in the morning and at bedtime 25  Yes Jeremy Walsh MD   doxycycline hyclate (VIBRA-TABS) 100 MG tablet Take 1 tablet by mouth 2 times daily for 7 days 5/8/25 5/15/25 Yes Jeremy Walsh MD   mupirocin (BACTROBAN) 2 % ointment Apply intranasally twice a day for 7 consecutive days and the morning of surgery. 25  Yes Jeremy Walsh MD   Cobalamin Combinations (NEURIVA PLUS PO) Take 1 capsule by mouth daily   Yes Elton Lord MD   Multiple Vitamins-Minerals (ICAPS AREDS 2 PO) Take 1 capsule by mouth daily   Yes Elton Lord MD   amLODIPine (NORVASC) 5 MG tablet Take 1 tablet by mouth at bedtime   Yes Elton Lord MD   simvastatin (ZOCOR) 10 MG tablet Take 1 tablet by mouth nightly   Yes Elton Lord MD   aspirin 81 MG tablet Take 1 tablet by mouth daily   Yes Elton Lord MD   vitamin B-12 (CYANOCOBALAMIN) 1000 MCG tablet Take 1 tablet by mouth daily   Yes Elton Lord MD   Calcium Carbonate-Vitamin D (CALTRATE 600+D PO) Take 1 tablet by mouth daily   Yes Elton Lord MD   Misc Natural Products (OSTEO BI-FLEX ADV JOINT SHIELD PO) Take 2 tablets by mouth daily   Yes Elton Lord MD   naloxone

## 2025-05-08 NOTE — OP NOTE
[place]   DR. FABRICE PRESSLEY  REVERSE SHOULDER ARTHROPLASTY OP NOTE        NAME OF SURGEON / : FABRICE Pressley MD  PATIENT:   Sara Persaud  Date: 5/8/2025        Time: 1:17 PM   Referring Physician: ________________________    PREOP DIAGNOSIS:  right rotator cuff tear arthropathy    POSTOP DIAGNOSIS:  Same     PROCEDURE:    Right    reverse Shoulder arthroplasty      IMPLANTS:   Implant Name Type Inv. Item Serial No.  Lot No. LRB No. Used Action   BASEPLATE CHAVA [id number] MINI SHLDR REV TAPR COMPHSVE - [id number]  BASEPLATE CHAVA [id number] MINI SHLDR REV TAPR COMPHSVE  [employer] [facility] [id number] Right 1 Implanted   SCREW BNE L25MM DIA6.5MM HEX HD DIA3.5MM FOR COMPHSVE CONV - [id number]  SCREW BNE L25MM DIA6.5MM HEX HD DIA3.5MM FOR COMPHSVE CONV  [employer]- [id number] Right 1 Implanted   SCREW BNE L25MM DIA4.75MM HD DIA3.5MM SARAH FIX ANG - [id number]  SCREW BNE L25MM DIA4.75MM HD DIA3.5MM SARAH FIX ANG  [employer]- [id number] Right 1 Implanted   SCREW BNE L20MM DIA4.75MM HD DIA3.5MM SARAH FIX ANG - [id number]  SCREW BNE L20MM DIA4.75MM HD DIA3.5MM SARAH FIX ANG  [employer] [facility] [id number] Right 1 Implanted   SCREW BNE L15MM DIA4.75MM HD DIA3.5MM SARAH SENAIT ANG - [id number]  SCREW BNE L15MM DIA4.75MM HD DIA3.5MM SARAH SENAIT ANG  [employer] [facility] [id number] Right 1 Implanted   SCREW BNE L15MM DIA4.75MM HD DIA3.5MM SARAH SENAIT ANG - [id number]  SCREW BNE L15MM DIA4.75MM HD DIA3.5MM SARAH SENAIT ANG  [employer]- [id number] Right 1 Implanted   SPHERE CHAVA [id number] REG STD CO CHROM TAPR FIT FOR COMPHSVE - [id number]  SPHERE CHAVA [id number] REG STD CO CHROM TAPR FIT FOR COMPHSVE  [employer]-WD [id number] Right 1 Implanted   STEM HUM 7MM TOM SHLDR CO CHROM COMPHSVE REV KHUSHBOO NABIL - [id number]  STEM HUM 7MM TOM SHLDR CO CHROM COMPHSVE REV KHUSHBOO NABIL  [employer]WD [id number] Right 1

## 2025-05-08 NOTE — ANESTHESIA POSTPROCEDURE EVALUATION
Department of Anesthesiology  Postprocedure Note    Patient: Sara Persaud  MRN: [id number]  YOB: 1960  Date of evaluation: 5/8/2025    Procedure Summary       Date: 05/08/25 Room / Location: [address]    Anesthesia Start: 1154 Anesthesia Stop:     Procedure: RIGHT REVERSE SHOULDER TOTAL ARTHROPLASTY (Right: Shoulder) Diagnosis:       Chronic right shoulder pain      (Chronic right shoulder pain [M25.511, G89.29])    Surgeons: Jeremy Walsh MD Responsible Provider: Iveth Warner APRN - CRNA    Anesthesia Type: General, Regional ASA Status: 2            Anesthesia Type: General, Regional    Shabbir Phase I: Shabbir Score: 10    Shabbir Phase II:      Anesthesia Post Evaluation    Patient location during evaluation: PACU  Patient participation: complete - patient participated  Level of consciousness: awake  Pain score: 0  Airway patency: patent  Nausea & Vomiting: no nausea and no vomiting  Cardiovascular status: hemodynamically stable  Respiratory status: spontaneous ventilation, room air and oral airway  Hydration status: stable  Comments: /49   Pulse 76  Temp 97.0°F (Temporal)   Resp 22    SpO2 93%     VSS, report given to RN    Pain management: adequate    No notable events documented.

## 2025-05-08 NOTE — PROGRESS NOTES
CLINICAL PHARMACY NOTE: MEDS TO BEDS    Total # of Prescriptions Filled: 3   The following medications were delivered to the patient:  Discharge Medication List as of 5/8/2025  2:10 PM        START taking these medications    Details   oxyCODONE (ROXICODONE) 5 MG immediate release tablet Take 1 tablet by mouth every 4 hours as needed for Pain for up to 10 days. Max Daily Amount: 30 mg, Disp-40 tablet, R-0Normal      aspirin (ASPIRIN CHILDRENS) 81 MG chewable tablet Take 1 tablet by mouth in the morning and at bedtime, Disp-60 tablet, R-0Normal      doxycycline hyclate (VIBRA-TABS) 100 MG tablet Take 1 tablet by mouth 2 times daily for 7 days, Disp-14 tablet, R-0Normal               Additional Documentation:  Patients family picked up sabiha. Paid with card.

## 2025-05-08 NOTE — PROGRESS NOTES
PATIENT IS DIZZY AT TIMES TRANSFERRED TO BATHROOM AND VOIDED WELL, SKIN P/W/D, NO ACUTE DISTRESS NOTED

## 2025-05-12 ENCOUNTER — TELEPHONE (OUTPATIENT)
Age: 65
End: 2025-05-12

## 2025-05-12 DIAGNOSIS — M12.811 ROTATOR CUFF TEAR ARTHROPATHY OF RIGHT SHOULDER: ICD-10-CM

## 2025-05-12 DIAGNOSIS — M75.101 ROTATOR CUFF TEAR ARTHROPATHY OF RIGHT SHOULDER: ICD-10-CM

## 2025-05-12 NOTE — TELEPHONE ENCOUNTER
Pt is calling to make sure she does not run out of pain meds and want to go ahead and call and get this started. Pt feels she will be out meds in a week. Pt  Nad ask that you call him

## 2025-05-14 ENCOUNTER — TELEPHONE (OUTPATIENT)
Age: 65
End: 2025-05-14

## 2025-05-14 RX ORDER — OXYCODONE HYDROCHLORIDE 5 MG/1
5 TABLET ORAL EVERY 4 HOURS PRN
Qty: 40 TABLET | Refills: 0 | Status: SHIPPED | OUTPATIENT
Start: 2025-05-16 | End: 2025-05-26

## 2025-05-14 NOTE — TELEPHONE ENCOUNTER
Patients  is calling stating his wife is needing a refill on pain meds sent to [facility]. He states she has been taking 6 a day

## 2025-05-14 NOTE — TELEPHONE ENCOUNTER
Patient and patient's  returned call. I informed them that  sent their medication in to [facility] pharmacy but could not  until 5/16/25. Encouraged to take tylenol xs every 6 hours which will allow some pain medication to be in patient's system at all time.Patient reported her pain is 6-8 on scale of 1-10. She denies any drainage or fever from incision. Patient verbalized understanding.

## 2025-05-14 NOTE — TELEPHONE ENCOUNTER
Attempted to return call to patients  no answer voicemail left. I would like to speak with him as patient should not be taking 6 tablets in one day. Will continue to attempt to contact

## 2025-05-21 ENCOUNTER — OFFICE VISIT (OUTPATIENT)
Age: 65
End: 2025-05-21

## 2025-05-21 VITALS — WEIGHT: 112.4 LBS | HEIGHT: 60 IN | BODY MASS INDEX: 22.07 KG/M2

## 2025-05-21 DIAGNOSIS — M25.511 RIGHT SHOULDER PAIN, UNSPECIFIED CHRONICITY: Primary | ICD-10-CM

## 2025-05-21 DIAGNOSIS — Z96.611 S/P REVERSE TOTAL SHOULDER ARTHROPLASTY, RIGHT: ICD-10-CM

## 2025-05-21 PROCEDURE — 99024 POSTOP FOLLOW-UP VISIT: CPT | Performed by: ORTHOPAEDIC SURGERY

## 2025-05-21 NOTE — PROGRESS NOTES
[facility]  [facility]  [address]  [facility] [id number] [phone]         Sara Persaud (: 1960) is a 64 y.o. female, patient, here for evaluation of the following chief complaint(s): Shoulder Pain (Right (TSR)/SX: 25 )  .         Patient's PCP: Russell Leigh DO     Patient's Last Appointment in this Department was on 3/26/2025      Subjective:     Chief Complaint   Patient presents with    Shoulder Pain     Right (TSR)  SX: 25         History of Present Illness  The patient presents for evaluation of her shoulder.    She reports experiencing pruritus and discomfort in her shoulder, describing it as hard and itchy. She has been managing these symptoms with Cortizone-10 cream, which was applied earlier today. She also mentions frequent skin breakouts, which she attributes to her sensitive skin and exposure to dust. She has been removing the dressing during showers but ensures it is replaced immediately afterwards. She is not experiencing any popping or shifting sensations in her shoulder but notes a feeling of heaviness upon movement. The accompanying adult male requests a prescription for a more potent topical treatment. He also inquires about the possibility of home-based physical therapy due to his occupation as a , which limits his availability for clinic visits. They have a sufficient supply of pain medication at home.    SOCIAL HISTORY  Occupations:   Diet: Vegetarian              Medications  Current Outpatient Medications   Medication Sig Dispense Refill    oxyCODONE (ROXICODONE) 5 MG immediate release tablet Take 1 tablet by mouth every 4 hours as needed for Pain for up to 10 days. Max Daily Amount: 30 mg 40 tablet 0    Cobalamin Combinations (NEURIVA PLUS PO) Take 1 capsule by mouth daily      amLODIPine (NORVASC) 5 MG tablet Take 1 tablet by mouth at bedtime

## 2025-05-22 ENCOUNTER — TELEPHONE (OUTPATIENT)
Age: 65
End: 2025-05-22

## 2025-05-22 NOTE — TELEPHONE ENCOUNTER
[facility] is calling on behalf of pt to speak to someone in [facility] regarding home health for pt. [facility] needs to know when authorization was sent for pt to have home health and physical therapy     [place] [phone]

## 2025-06-06 ENCOUNTER — TELEPHONE (OUTPATIENT)
Age: 65
End: 2025-06-06

## 2025-06-06 NOTE — TELEPHONE ENCOUNTER
Stefania and pt are calling in on a double line trying to figure out prior authorization. [employer] states they have never received an authorization for home health.   Fax: [phone] [facility]

## 2025-06-10 ENCOUNTER — TELEPHONE (OUTPATIENT)
Age: 65
End: 2025-06-10

## 2025-06-10 DIAGNOSIS — Z96.611 S/P REVERSE TOTAL SHOULDER ARTHROPLASTY, RIGHT: Primary | ICD-10-CM

## 2025-06-10 DIAGNOSIS — G89.18 POST-OPERATIVE PAIN: ICD-10-CM

## 2025-06-10 NOTE — PROGRESS NOTES
Contacted patients  and he was very frustrated that he has been calling for the past week to get patients pain medication sent in. Patient reports pain of 7-8 especially when she is trying to sleep. Patients pain wakes her and she is unable to get back to sleep. Norco 5mg 1 PO every 6 hours PRN Pain #42 sent to Dr. Pressley to authorize and send to [place] pharmacy. I informed patient and patients wife that Dr. Pressley would not be in until tomorrow Wednesday 6/11/25.And they both verbalized understanding. I did reach out to Virginia Arredondo at [facility] and due to staffing they can not only do Occupational therapy. I have contacted Shamika North with [facility] to see if she can assist. Informed patient and patients  that if we do not have a HH that can see patient , she might need to go to OPT Therapy. Patient  became upset and stated \" she can't drive and I am a  and my kids work\". I informed patients  I would return there call tomoroow to give them and update.

## 2025-06-10 NOTE — TELEPHONE ENCOUNTER
Pt  has called back about this     He would also like to talk to someone about her HH as well    normal (ped)...

## 2025-06-11 ENCOUNTER — TELEPHONE (OUTPATIENT)
Age: 65
End: 2025-06-11

## 2025-06-11 DIAGNOSIS — Z96.611 S/P REVERSE TOTAL SHOULDER ARTHROPLASTY, RIGHT: Primary | ICD-10-CM

## 2025-06-11 RX ORDER — HYDROCODONE BITARTRATE AND ACETAMINOPHEN 5; 325 MG/1; MG/1
1 TABLET ORAL EVERY 6 HOURS PRN
Qty: 42 TABLET | Refills: 0 | Status: SHIPPED | OUTPATIENT
Start: 2025-06-11 | End: 2025-06-25

## 2025-06-11 NOTE — TELEPHONE ENCOUNTER
Outpatient physical therapy orders sent to [facility] in [place] per patients request.

## 2025-06-11 NOTE — TELEPHONE ENCOUNTER
Contacted multiple HH agency's and due to staffing issues there is no one to accept patient. Informed patients  and he is very upset. Informed Dr. Walsh of the issues with HH and he agreed patient needs to go to outpatient therapy. Patients  will return call and let me know who patient would like to go to.

## 2025-06-12 ENCOUNTER — TELEPHONE (OUTPATIENT)
Age: 65
End: 2025-06-12

## 2025-06-18 ENCOUNTER — OFFICE VISIT (OUTPATIENT)
Age: 65
End: 2025-06-18

## 2025-06-18 DIAGNOSIS — Z96.611 S/P REVERSE TOTAL SHOULDER ARTHROPLASTY, RIGHT: Primary | ICD-10-CM

## 2025-06-18 PROCEDURE — 99024 POSTOP FOLLOW-UP VISIT: CPT | Performed by: ORTHOPAEDIC SURGERY

## 2025-06-18 RX ORDER — HYDROCODONE BITARTRATE AND ACETAMINOPHEN 5; 325 MG/1; MG/1
1 TABLET ORAL EVERY 4 HOURS PRN
Qty: 40 TABLET | Refills: 0 | Status: SHIPPED | OUTPATIENT
Start: 2025-06-18 | End: 2025-06-25

## 2025-06-18 NOTE — PROGRESS NOTES
[facility]  [facility]  [address]  [facility] [id number] [phone]         Sara Persaud (: 1960) is a 64 y.o. female, patient, here for evaluation of the following chief complaint(s): Shoulder Pain (Right (TSR)/SX: 25)  .         Patient's PCP: Russell Leigh DO     Patient's Last Appointment in this Department was on 2025      Subjective:     Chief Complaint   Patient presents with    Shoulder Pain     Right (TSR)  SX: 25        History of Present Illness    Patient presents in follow-up after right reverse shoulder arthroplasty May 8.  She cannot tell the surgeries helped her yet.  She still has continued pain in the shoulder and weakness.  She never got home therapy.  She is been going to outpatient therapy.  She has been taking some pain medication.            Medications  Current Outpatient Medications   Medication Sig Dispense Refill    HYDROcodone-acetaminophen (NORCO) 5-325 MG per tablet Take 1 tablet by mouth every 4 hours as needed for Pain for up to 7 days. Max Daily Amount: 6 tablets 40 tablet 0    HYDROcodone-acetaminophen (NORCO) 5-325 MG per tablet Take 1 tablet by mouth every 6 hours as needed for Pain for up to 14 days. Take lowest dose possible to manage pain Max Daily Amount: 4 tablets 42 tablet 0    Cobalamin Combinations (NEURIVA PLUS PO) Take 1 capsule by mouth daily      Multiple Vitamins-Minerals (ICAPS AREDS 2 PO) Take 1 capsule by mouth daily      levocetirizine (XYZAL) 5 MG tablet Take 1 tablet by mouth nightly      guaiFENesin (MUCINEX) 600 MG extended release tablet Take 2 tablets by mouth 2 times daily      amLODIPine (NORVASC) 5 MG tablet Take 1 tablet by mouth at bedtime      simvastatin (ZOCOR) 10 MG tablet Take 1 tablet by mouth nightly      aspirin 81 MG tablet Take 1 tablet by mouth daily      vitamin B-12 (CYANOCOBALAMIN) 1000 MCG tablet Take 1 tablet by mouth daily

## 2025-08-04 ENCOUNTER — OFFICE VISIT (OUTPATIENT)
Age: 65
End: 2025-08-04
Payer: MEDICARE

## 2025-08-04 VITALS — HEIGHT: 60 IN | BODY MASS INDEX: 21.71 KG/M2 | WEIGHT: 110.6 LBS

## 2025-08-04 DIAGNOSIS — G56.01 RIGHT CARPAL TUNNEL SYNDROME: ICD-10-CM

## 2025-08-04 DIAGNOSIS — Z96.611 S/P REVERSE TOTAL SHOULDER ARTHROPLASTY, RIGHT: ICD-10-CM

## 2025-08-04 DIAGNOSIS — M25.511 RIGHT SHOULDER PAIN, UNSPECIFIED CHRONICITY: Primary | ICD-10-CM

## 2025-08-04 PROCEDURE — 1036F TOBACCO NON-USER: CPT | Performed by: ORTHOPAEDIC SURGERY

## 2025-08-04 PROCEDURE — 99213 OFFICE O/P EST LOW 20 MIN: CPT | Performed by: ORTHOPAEDIC SURGERY

## 2025-08-04 PROCEDURE — G8420 CALC BMI NORM PARAMETERS: HCPCS | Performed by: ORTHOPAEDIC SURGERY

## 2025-08-04 PROCEDURE — G8427 DOCREV CUR MEDS BY ELIG CLIN: HCPCS | Performed by: ORTHOPAEDIC SURGERY

## 2025-08-04 PROCEDURE — G8400 PT W/DXA NO RESULTS DOC: HCPCS | Performed by: ORTHOPAEDIC SURGERY

## 2025-08-04 PROCEDURE — 3017F COLORECTAL CA SCREEN DOC REV: CPT | Performed by: ORTHOPAEDIC SURGERY

## 2025-08-04 PROCEDURE — 1123F ACP DISCUSS/DSCN MKR DOCD: CPT | Performed by: ORTHOPAEDIC SURGERY

## 2025-08-04 PROCEDURE — 1090F PRES/ABSN URINE INCON ASSESS: CPT | Performed by: ORTHOPAEDIC SURGERY

## 2025-08-04 PROCEDURE — 1159F MED LIST DOCD IN RCRD: CPT | Performed by: ORTHOPAEDIC SURGERY

## (undated) DEVICE — APPL CHLORAPREP W/TINT 26ML ORNG

## (undated) DEVICE — INTENDED FOR TISSUE SEPARATION, AND OTHER PROCEDURES THAT REQUIRE A SHARP SURGICAL BLADE TO PUNCTURE OR CUT.: Brand: BARD-PARKER ® STAINLESS STEEL BLADES

## (undated) DEVICE — PK SPINE POST 30

## (undated) DEVICE — T-MAX DISPOSABLE FACE MASK 8 PER BOX

## (undated) DEVICE — GLOVE SURG SZ 85 L12IN FNGR THK79MIL GRN LTX FREE

## (undated) DEVICE — SYSTEM SKIN CLSR 22CM DERMBND PRINEO

## (undated) DEVICE — BIT DRL DIA2.7MM PERIPH SCR REUSE FOR COMPHSVE REV SHLDR

## (undated) DEVICE — KT ACC LAT EMG/SSEP TIMBERLINE GEN2

## (undated) DEVICE — IMMOBILIZER SHLDR M L15IN D8IN UNIV POLY COT W/ FOAM STRP

## (undated) DEVICE — HANDPIECE SET WITH COAXIAL MULTI-ORIFICE TIP AND SUCTION TUBE: Brand: INTERPULSE

## (undated) DEVICE — DRP C/ARMOR

## (undated) DEVICE — GOWN, ORBIS, XLNG/XXLARGE, STRL: Brand: MEDLINE

## (undated) DEVICE — SYS ACC IPAS3 EMG I/O PED BVL

## (undated) DEVICE — SPK10277 JACKSON/PRO-AXIS KIT: Brand: SPK10277 JACKSON/PRO-AXIS KIT

## (undated) DEVICE — SUTURE VICRYL + 3-0 L27IN ABSRB UD PS-2 L19MM 3/8 CIR PRIM VCP427H

## (undated) DEVICE — CLTH CLENS READYCLEANSE PERI CARE PK/5

## (undated) DEVICE — 4-PORT MANIFOLD: Brand: NEPTUNE 2

## (undated) DEVICE — CONN FLX BREATHE CIRCT

## (undated) DEVICE — ANTIBACTERIAL UNDYED BRAIDED (POLYGLACTIN 910), SYNTHETIC ABSORBABLE SUTURE: Brand: COATED VICRYL

## (undated) DEVICE — SHEET,DRAPE,53X77,STERILE: Brand: MEDLINE

## (undated) DEVICE — GLOVE SURG SZ 85 L12IN FNGR ORTHO 126MIL CRM LTX FREE

## (undated) DEVICE — COVER,TABLE,44X90,STERILE: Brand: MEDLINE

## (undated) DEVICE — GOWN,NON-REINFORCED,SIRUS,SET IN SLV,XXL: Brand: MEDLINE

## (undated) DEVICE — 3M™ IOBAN™ 2 ANTIMICROBIAL INCISE DRAPE 6651EZ: Brand: IOBAN™ 2

## (undated) DEVICE — ELECTRD BLD EDGE/INSUL1P 2.4X5.1MM STRL

## (undated) DEVICE — BLUNT TIP NITINOL GUIDEWIRE 17.75": Brand: ILLICO

## (undated) DEVICE — SUTURE VICRYL + SZ 0 L27IN ABSRB UD CT-1 L36MM 1/2 CIR TAPR VCP260H

## (undated) DEVICE — DRSNG WND SIL OPTIFOAM GNTL BRDR ADHS 1.6X2IN

## (undated) DEVICE — PK SPINE LAT 30

## (undated) DEVICE — GLV SURG TRIUMPH GREEN W/ALOE PF LTX 8 STRL

## (undated) DEVICE — NEPTUNE E-SEP SMOKE EVACUATION PENCIL, COATED, 70MM BLADE, ROCKER SWITCH: Brand: NEPTUNE E-SEP

## (undated) DEVICE — Device

## (undated) DEVICE — DRAPE,SHOULDER,BEACH CHAIR,STERILE: Brand: MEDLINE

## (undated) DEVICE — DUAL CUT SAGITTAL BLADE

## (undated) DEVICE — SUTURE VICRYL + SZ 2-0 L36IN ABSRB UD L36MM CT-1 1/2 CIR VCP945H

## (undated) DEVICE — CATH IV ANGIO FEP 12G 3IN LTBLU 10PK

## (undated) DEVICE — DRAPE,UTILITY,TAPE,15X26,STERILE: Brand: MEDLINE

## (undated) DEVICE — GLV SURG TRIUMPH GREEN W/ALOE PF LTX 7 STRL

## (undated) DEVICE — KWIRE TIMBERLINE BLNT
Type: IMPLANTABLE DEVICE | Status: NON-FUNCTIONAL
Removed: 2018-07-30

## (undated) DEVICE — GLV SURG SENSICARE W/ALOE PF LF 8 STRL

## (undated) DEVICE — SPNG GZ WOVN 4X4IN 12PLY 10/BX STRL

## (undated) DEVICE — YANKAUER SUCTION INSTRUMENT WITHOUT CONTROL VENT, OPEN TIP, CLEAR: Brand: YANKAUER

## (undated) DEVICE — GLV SURG SENSICARE W/ALOE PF LF 7.5 STRL

## (undated) DEVICE — DRESSING BORDERED ADH GZ UNIV GEN USE 8INX4IN AND 6INX2IN

## (undated) DEVICE — IMPLANTABLE DEVICE
Type: IMPLANTABLE DEVICE | Status: NON-FUNCTIONAL
Removed: 2018-07-30

## (undated) DEVICE — TP SILK DURAPORE 3IN

## (undated) DEVICE — GLV SURG BIOGEL LTX PF 6 1/2

## (undated) DEVICE — GLV SURG BIOGEL LTX PF 7 1/2

## (undated) DEVICE — CURAVIEW LED LARYNGOSCOPE BLADE & HANDLE,DISPOSABLE,MAC 3.5: Brand: CURAPLEX

## (undated) DEVICE — WRAP AROUND LENS-STERLIE

## (undated) DEVICE — KT MONTR EMG/SSEP TIMBERLINE GEN2 LD2.5M

## (undated) DEVICE — SPONGE LAP ST XRAY 18X18

## (undated) DEVICE — PK TURNOVER RM ADV

## (undated) DEVICE — GLOVE SURG SZ 85 CRM LTX FREE POLYISOPRENE POLYMER BEAD ANTI

## (undated) DEVICE — TUBE ET 6.5MM NSL ORAL BASIC CUF INTMED MURPHY EYE RADPQ